# Patient Record
Sex: MALE | Race: BLACK OR AFRICAN AMERICAN | Employment: UNEMPLOYED | URBAN - METROPOLITAN AREA
[De-identification: names, ages, dates, MRNs, and addresses within clinical notes are randomized per-mention and may not be internally consistent; named-entity substitution may affect disease eponyms.]

---

## 2017-10-03 ENCOUNTER — APPOINTMENT (OUTPATIENT)
Dept: GENERAL RADIOLOGY | Facility: CLINIC | Age: 22
End: 2017-10-03
Attending: EMERGENCY MEDICINE

## 2017-10-03 ENCOUNTER — HOSPITAL ENCOUNTER (EMERGENCY)
Facility: CLINIC | Age: 22
Discharge: HOME | End: 2017-10-03
Attending: EMERGENCY MEDICINE | Admitting: EMERGENCY MEDICINE

## 2017-10-03 VITALS
SYSTOLIC BLOOD PRESSURE: 136 MMHG | HEART RATE: 76 BPM | BODY MASS INDEX: 31.65 KG/M2 | DIASTOLIC BLOOD PRESSURE: 93 MMHG | WEIGHT: 190 LBS | RESPIRATION RATE: 16 BRPM | OXYGEN SATURATION: 97 % | HEIGHT: 65 IN | TEMPERATURE: 98.3 F

## 2017-10-03 DIAGNOSIS — R09.A2 GLOBUS SENSATION: ICD-10-CM

## 2017-10-03 PROCEDURE — 70360 X-RAY EXAM OF NECK: CPT

## 2017-10-03 PROCEDURE — 71020 XR CHEST 2 VW: CPT

## 2017-10-03 PROCEDURE — 99284 EMERGENCY DEPT VISIT MOD MDM: CPT | Mod: 25

## 2017-10-03 RX ORDER — LORAZEPAM 1 MG/1
1 TABLET ORAL EVERY 8 HOURS PRN
Qty: 8 TABLET | Refills: 0 | Status: SHIPPED | OUTPATIENT
Start: 2017-10-03 | End: 2018-07-21

## 2017-10-03 ASSESSMENT — ENCOUNTER SYMPTOMS
TROUBLE SWALLOWING: 1
VOICE CHANGE: 0
SORE THROAT: 0

## 2017-10-03 NOTE — ED NOTES
Pt feels as if something is stuck in his throat. Not sure when it started. Pt stated that he took a nyquil gel cap on Saturday and that's the only thing he can think of that could be stuck. Pt has been able to eat and drink. Pain 10/10 in throat.

## 2017-10-03 NOTE — ED AVS SNAPSHOT
[facility]    [address] [place] [address]    Phone:  [phone]    Fax:  [phone]                                       Tariq Rodriguez   MRN: [id number]    Department:  [facility]   Date of Visit:  10/3/2017           Patient Information     Date Of Birth          1995        Your diagnoses for this visit were:     Globus sensation        You were seen by Willie Stein MD.      Follow-up Information     Follow up with [facility].    Why:  for re-evaluation of your symptoms        Discharge Instructions         Conversion Disorder (Conversion Reaction)  You are showing signs of conversion disorder. This happens when stress or conflict triggers physical symptoms. It may in some ways look like a neurological disorder, but is not. Symptoms include:    Not being able to move or feel parts of your body    Not being able to stand or walk normally    Movement of your body that feels out of your control    Loss of normal speech, sight, or hearing    Trouble urinating or swallowing    Tremors (shaking) or non-epileptic seizures (convulsions)  In the ER, you will probably have some tests done to make sure there are not other causes of your symptoms. You may also be given medications to help relax you. The main treatment for conversion disorder is counseling. This can be arranged through your doctor. Our staff may also give you referrals to someone you can talk to.  Home care  If you have been given medicine, take it as you have been told to by the doctor or hospital staff.  Tell each of your healthcare providers about all of the prescription drugs, over-the-counter medicines, vitamins, and supplements you take. Certain supplements interact with medicines and may result in dangerous side effects. Ask your pharmacist when you have questions about drug interactions.  Symptoms of conversion disorder most often improve over a period of  a few weeks without specific medicine. The main treatment for conversion disorder is psychotherapy or counseling. This can include individual and group therapy. Medicine may be used to treat anxiety or depression. Other treatments that might help include physical therapy, hypnosis and relaxation.  Follow-up care  Follow up with your healthcare provider, or as advised.    If X-rays or a CT scan were done, and a radiologist had not seen them while you were there, they will be reviewed, and you will be notified if there is a change in the reading, especially if it affects treatment.  Call 911  Call 911 if any of these occur:    Trouble breathing    Very confused    Very drowsy or trouble awakening    Fainting or loss of consciousness    Rapid heart rate    Seizure  When to seek medical advice  Call your healthcare provider right away if any of the following occur:    Worsening of your symptoms or symptoms not improving over time    New symptoms    Desire to harm yourself or others  Date Last Reviewed: 9/29/2015 2000-2017 [employer]. [address] [place] [address]. All rights reserved. This information is not intended as a substitute for professional medical care. Always follow your healthcare professional's instructions.          24 Hour Appointment Hotline       To make an appointment at any [facility], call [phone] ([phone]). If you don't have a family doctor or clinic, we will help you find one. [facility] are conveniently located to serve the needs of you and your family.             Review of your medicines      START taking        Dose / Directions Last dose taken    LORazepam 1 MG tablet   Commonly known as:  ATIVAN   Dose:  1 mg   Quantity:  8 tablet        Take 1 tablet (1 mg) by mouth every 8 hours as needed for anxiety   Refills:  0        ranitidine 150 MG tablet   Commonly known as:  ZANTAC   Dose:  150 mg   Quantity:  20 tablet        Take 1 tablet  (150 mg) by mouth 2 times daily for 10 days   Refills:  0          Our records show that you are taking the medicines listed below. If these are incorrect, please call your family doctor or clinic.        Dose / Directions Last dose taken    albuterol 108 (90 BASE) MCG/ACT Inhaler   Commonly known as:  PROAIR HFA/PROVENTIL HFA/VENTOLIN HFA   Dose:  2 puff   Quantity:  1 Inhaler        Inhale 2 puffs into the lungs every 6 hours as needed for shortness of breath / dyspnea or wheezing   Refills:  0        SEROQUEL PO   Dose:  250 mg        Take 250 mg by mouth   Refills:  0                Prescriptions were sent or printed at these locations (2 Prescriptions)                   Other Prescriptions                Printed at Department/Unit printer (2 of 2)         ranitidine (ZANTAC) 150 MG tablet               LORazepam (ATIVAN) 1 MG tablet                Procedures and tests performed during your visit     Chest XR,  PA & LAT    Neck soft tissue XR      Orders Needing Specimen Collection     None      Pending Results     Date and Time Order Name Status Description    10/3/2017 1757 Neck soft tissue XR Preliminary             Pending Culture Results     No orders found from 10/1/2017 to 10/4/2017.            Pending Results Instructions     If you had any lab results that were not finalized at the time of your Discharge, you can call the ED Lab Result RN at [phone]. You will be contacted by this team for any positive Lab results or changes in treatment. The nurses are available 7 days a week from 10A to 6:30P.  You can leave a message 24 hours per day and they will return your call.        Test Results From Your Hospital Stay        10/3/2017  6:59 PM      Narrative     NECK SOFT TISSUE ONE VIEW  10/3/2017 6:46 PM     HISTORY: Foreign body sensation.        Impression     IMPRESSION: Unremarkable exam.         10/3/2017  6:49 PM      Narrative     XR CHEST 2 VW 10/3/2017 6:46 PM     HISTORY: f.b. sensation in  lower throat     COMPARISON: 10/12/2011        Impression     IMPRESSION:  No acute pulmonary disease.    FLAQUITO LANCE MD                Clinical Quality Measure: Blood Pressure Screening     Your blood pressure was checked while you were in the emergency department today. The last reading we obtained was  BP: (!) 136/93 . Please read the guidelines below about what these numbers mean and what you should do about them.  If your systolic blood pressure (the top number) is less than 120 and your diastolic blood pressure (the bottom number) is less than 80, then your blood pressure is normal. There is nothing more that you need to do about it.  If your systolic blood pressure (the top number) is 120-139 or your diastolic blood pressure (the bottom number) is 80-89, your blood pressure may be higher than it should be. You should have your blood pressure rechecked within a year by a primary care provider.  If your systolic blood pressure (the top number) is 140 or greater or your diastolic blood pressure (the bottom number) is 90 or greater, you may have high blood pressure. High blood pressure is treatable, but if left untreated over time it can put you at risk for heart attack, stroke, or kidney failure. You should have your blood pressure rechecked by a primary care provider within the next 4 weeks.  If your provider in the emergency department today gave you specific instructions to follow-up with your doctor or provider even sooner than that, you should follow that instruction and not wait for up to 4 weeks for your follow-up visit.        Thank you for choosing [facility]       Thank you for choosing [facility] for your care. Our goal is always to provide you with excellent care. Hearing back from our patients is one way we can continue to improve our services. Please take a few minutes to complete the written survey that you may receive in the mail after you visit with us. Thank you!        MyChart Information      "[employer] lets you send messages to your doctor, view your test results, renew your prescriptions, schedule appointments and more. To sign up, go to [email or web address] . Click on \"Log in\" on the left side of the screen, which will take you to the Welcome page. Then click on \"Sign up Now\" on the right side of the page.     You will be asked to enter the access code listed below, as well as some personal information. Please follow the directions to create your username and password.     Your access code is: [email or web address]  Expires: 2018  7:11 PM     Your access code will  in 90 days. If you need help or a new code, please call your [facility] or [phone].        Care EveryWhere ID     This is your Care EveryWhere ID. This could be used by other organizations to access your [facility] medical records  [id number]        Equal Access to Services     DEMARCO LOCKWOOD AH: Hadii castillo Tamayo, wabeth weber, qated kaalmadano salmon, francesca novak . So [facility] [phone].    ATENCIÓN: Si habla español, tiene a dumont disposición servicios gratuitos de asistencia lingüística. Llame al [phone].    We comply with applicable federal civil rights laws and [place] laws. We do not discriminate on the basis of race, color, national origin, age, disability, sex, sexual orientation, or gender identity.            After Visit Summary       This is your record. Keep this with you and show to your community pharmacist(s) and doctor(s) at your next visit.                  "

## 2017-10-03 NOTE — DISCHARGE INSTRUCTIONS
Conversion Disorder (Conversion Reaction)  You are showing signs of conversion disorder. This happens when stress or conflict triggers physical symptoms. It may in some ways look like a neurological disorder, but is not. Symptoms include:    Not being able to move or feel parts of your body    Not being able to stand or walk normally    Movement of your body that feels out of your control    Loss of normal speech, sight, or hearing    Trouble urinating or swallowing    Tremors (shaking) or non-epileptic seizures (convulsions)  In the ER, you will probably have some tests done to make sure there are not other causes of your symptoms. You may also be given medications to help relax you. The main treatment for conversion disorder is counseling. This can be arranged through your doctor. Our staff may also give you referrals to someone you can talk to.  Home care  If you have been given medicine, take it as you have been told to by the doctor or hospital staff.  Tell each of your healthcare providers about all of the prescription drugs, over-the-counter medicines, vitamins, and supplements you take. Certain supplements interact with medicines and may result in dangerous side effects. Ask your pharmacist when you have questions about drug interactions.  Symptoms of conversion disorder most often improve over a period of a few weeks without specific medicine. The main treatment for conversion disorder is psychotherapy or counseling. This can include individual and group therapy. Medicine may be used to treat anxiety or depression. Other treatments that might help include physical therapy, hypnosis and relaxation.  Follow-up care  Follow up with your healthcare provider, or as advised.    If X-rays or a CT scan were done, and a radiologist had not seen them while you were there, they will be reviewed, and you will be notified if there is a change in the reading, especially if it affects treatment.  Call 911  Call 911 if  any of these occur:    Trouble breathing    Very confused    Very drowsy or trouble awakening    Fainting or loss of consciousness    Rapid heart rate    Seizure  When to seek medical advice  Call your healthcare provider right away if any of the following occur:    Worsening of your symptoms or symptoms not improving over time    New symptoms    Desire to harm yourself or others  Date Last Reviewed: 9/29/2015 2000-2017 [employer]. [address] [place] [address], [place] [id number]. All rights reserved. This information is not intended as a substitute for professional medical care. Always follow your healthcare professional's instructions.

## 2017-10-03 NOTE — ED PROVIDER NOTES
History     Chief Complaint:  Throat Problem       HPI   Tariq Rodriguez is a 22 year old male who presents with sensation of lump in throat since yesterday.  Some discomfort with swallowing, but tolerating po.  No fever, actual choking spell or specific instance of problem swallowing food which could have been caught.  Admits to some associated stress about symptoms.  No prior episode.    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 No pertinent surgical history.     Family History:    family history includes Breast Cancer in his maternal grandmother; Hypertension in his mother.    Social History:   reports that he has been smoking Cigarettes.  He has been smoking about 0.25 packs per day. He has never used smokeless tobacco. He reports that he does not drink alcohol or use illicit drugs.    PCP: No Ref-Primary, Physician     Review of Systems   HENT: Positive for trouble swallowing. Negative for drooling, mouth sores, sore throat and voice change.    All other systems reviewed and are negative.      Physical Exam   Patient Vitals for the past 24 hrs:   BP Temp Temp src Pulse Resp SpO2 Height Weight   10/03/17 1720 (!) 139/92  "98.3  F (36.8  C) Oral 76 16 100 % 1.651 m (5' 5\") 86.2 kg (190 lb)        Physical Exam  General: Patient is alert and cooperative.  Normal voice, handling oral secretions.  HENT:  Normal oropharynx.  No posterior changes, floor of mouth normal.  Moist oral mucosa.  Eyes: EOMI. Normal conjunctiva.  Neck: Normal range of motion. Neck supple. No stridor.    Cardiovascular: Normal rate.   Pulmonary/Chest: Effort normal.   Abdominal: Soft. There is no tenderness.       Musculoskeletal: Normal range of motion. Patient exhibits no edema and no tenderness.   Neurological: Patient  is alert and oriented. Coordination normal, normal CN.  Skin: Skin is warm and dry. No rash noted.   Psychiatric: Patient has a normal mood and affect.    Emergency Department Course       Imaging:  Neck soft tissue x-ray and chest pa/lateral: both normal.    Emergency Department Course:  Past medical records, nursing notes, and vitals reviewed.  I performed an exam of the patient and obtained history, as documented above.  I rechecked the patient. Findings and plan explained to the Patient.    Impression & Plan    Medical Decision Making:  Afebrile healthy young man has presented with globus sensation.  Normal oropharynx, neck, cardiopulmonary and gi examinations.  Testing included soft tissue neck and chest x-ray, in part, to reassure patient.  No indication for flexible naropharyngoscopy, sensation is below vocal cords.  Normal phonation, tolerating po.  No history of consistent GERD, but suggested trial of H2 blocker, prn anxiolytic, and f/u GI for consultation if symptoms persist.   .    Diagnosis:    ICD-10-CM    1. Globus sensation F45.8         Discharge Medications:  New Prescriptions    No medications on file        10/3/2017   Willie Stein MD Isaacson, Brian A, MD  10/04/17 1135    "

## 2017-10-03 NOTE — ED AVS SNAPSHOT
[facility]    [address]    [facility] [address]    Phone:  [phone]    Fax:  [phone]                                       Tariq Rodriguez   MRN: [id number]    Department:  [facility]   Date of Visit:  10/3/2017           After Visit Summary Signature Page     I have received my discharge instructions, and my questions have been answered. I have discussed any challenges I see with this plan with the nurse or doctor.    ..........................................................................................................................................  Patient/Patient Representative Signature      ..........................................................................................................................................  Patient Representative Print Name and Relationship to Patient    ..................................................               ................................................  Date                                            Time    ..........................................................................................................................................  Reviewed by Signature/Title    ...................................................              ..............................................  Date                                                            Time

## 2017-10-04 NOTE — ED NOTES
"Pt standing dressed in doorway stating he needs to catch a bus. Told pt would like d/c set of vitals. Pt states \"I really need to go\". No d/c vitals obtained.  "

## 2017-10-31 ENCOUNTER — HOSPITAL ENCOUNTER (EMERGENCY)
Facility: CLINIC | Age: 22
Discharge: HOME | End: 2017-10-31
Attending: PHYSICIAN ASSISTANT | Admitting: PHYSICIAN ASSISTANT

## 2017-10-31 VITALS
HEART RATE: 78 BPM | DIASTOLIC BLOOD PRESSURE: 77 MMHG | SYSTOLIC BLOOD PRESSURE: 147 MMHG | TEMPERATURE: 98.4 F | RESPIRATION RATE: 20 BRPM | OXYGEN SATURATION: 97 %

## 2017-10-31 DIAGNOSIS — M25.532 PAIN IN BOTH WRISTS: ICD-10-CM

## 2017-10-31 DIAGNOSIS — M25.531 PAIN IN BOTH WRISTS: ICD-10-CM

## 2017-10-31 LAB
ALBUMIN SERPL-MCNC: 3.9 G/DL (ref 3.4–5)
ALP SERPL-CCNC: 73 U/L (ref 40–150)
ALT SERPL W P-5'-P-CCNC: 24 U/L (ref 0–70)
ANION GAP SERPL CALCULATED.3IONS-SCNC: 8 MMOL/L (ref 3–14)
AST SERPL W P-5'-P-CCNC: 22 U/L (ref 0–45)
BILIRUB SERPL-MCNC: 0.4 MG/DL (ref 0.2–1.3)
BUN SERPL-MCNC: 16 MG/DL (ref 7–30)
CALCIUM SERPL-MCNC: 8.5 MG/DL (ref 8.5–10.1)
CHLORIDE SERPL-SCNC: 107 MMOL/L (ref 94–109)
CO2 SERPL-SCNC: 26 MMOL/L (ref 20–32)
CREAT SERPL-MCNC: 1.14 MG/DL (ref 0.66–1.25)
GFR SERPL CREATININE-BSD FRML MDRD: 80 ML/MIN/1.7M2
GLUCOSE SERPL-MCNC: 91 MG/DL (ref 70–99)
NT-PROBNP SERPL-MCNC: <5 PG/ML (ref 0–450)
POTASSIUM SERPL-SCNC: 3.9 MMOL/L (ref 3.4–5.3)
PROT SERPL-MCNC: 7 G/DL (ref 6.8–8.8)
SODIUM SERPL-SCNC: 141 MMOL/L (ref 133–144)

## 2017-10-31 PROCEDURE — 99283 EMERGENCY DEPT VISIT LOW MDM: CPT

## 2017-10-31 PROCEDURE — 36415 COLL VENOUS BLD VENIPUNCTURE: CPT | Performed by: PHYSICIAN ASSISTANT

## 2017-10-31 PROCEDURE — 83880 ASSAY OF NATRIURETIC PEPTIDE: CPT | Performed by: PHYSICIAN ASSISTANT

## 2017-10-31 PROCEDURE — 80053 COMPREHEN METABOLIC PANEL: CPT | Performed by: PHYSICIAN ASSISTANT

## 2017-10-31 RX ORDER — NAPROXEN 500 MG/1
500 TABLET ORAL 2 TIMES DAILY WITH MEALS
Qty: 14 TABLET | Refills: 0 | Status: SHIPPED | OUTPATIENT
Start: 2017-10-31 | End: 2017-11-07

## 2017-10-31 ASSESSMENT — ENCOUNTER SYMPTOMS
ARTHRALGIAS: 1
JOINT SWELLING: 1
NUMBNESS: 0

## 2017-10-31 NOTE — LETTER
[facility] EMERGENCY DEPARTMENT  [address] [place] [address]  Phone: [phone]  Fax: [phone]    October 31, 2017        Tariq Rodriguez  [address] [place] [address]          To whom it may concern:    RE: Tariq Rodriguez    Please excuse Tariq from work tomorrow due to his wrist discomfort. He was advised to rest and ice the areas for the next couple of days.     Please contact me for questions or concerns.      Sincerely,  Carolyn Carbajal PA-C

## 2017-10-31 NOTE — ED AVS SNAPSHOT
[facility] Emergency Department    [address]    [facility] [address]    Phone:  [phone]    Fax:  [phone]                                       Tariq Rodriguez   MRN: [id number]    Department:  [facility] Emergency Department   Date of Visit:  10/31/2017           Patient Information     Date Of Birth          1995        Your diagnoses for this visit were:     Pain in both wrists        You were seen by Carolyn Carbajal PA-C.      Follow-up Information     Follow up with [facility] Emergency Department.    Specialty:  EMERGENCY MEDICINE    Why:  If symptoms worsen    Contact information:    [address] [facility] [address] [phone]        Follow up with [facility] In 5 days.    Why:  if not improving    Contact information:    [address] [facility]  [address]  [facility] [address] [phone]        Discharge Instructions       Rest. Ice, elevate.   Wear splints to help with pain.   Naproxen to help with pain and inflammation. Do not take ibuprofen with this. You can take extra strength Tylenol 1000 mg every 6 hours.   Follow up with primary if not improving in 5 days.         Discharge References/Attachments     TENDONITIS (ENGLISH)      24 Hour Appointment Hotline       To make an appointment at any [facility] clinic, call [phone] ([phone]). If you don't have a family doctor or clinic, we will help you find one. [facility] clinics are conveniently located to serve the needs of you and your family.             Review of your medicines      START taking        Dose / Directions Last dose taken    naproxen 500 MG tablet   Commonly known as:  NAPROSYN   Dose:  500 mg   Quantity:  14 tablet        Take 1 tablet (500 mg) by mouth 2 times daily (with meals) for 7 days   Refills:  0          Our records show that you are taking the medicines listed below. If these are incorrect,  please call your family doctor or clinic.        Dose / Directions Last dose taken    albuterol 108 (90 BASE) MCG/ACT Inhaler   Commonly known as:  PROAIR HFA/PROVENTIL HFA/VENTOLIN HFA   Dose:  2 puff   Quantity:  1 Inhaler        Inhale 2 puffs into the lungs every 6 hours as needed for shortness of breath / dyspnea or wheezing   Refills:  0        LORazepam 1 MG tablet   Commonly known as:  ATIVAN   Dose:  1 mg   Quantity:  8 tablet        Take 1 tablet (1 mg) by mouth every 8 hours as needed for anxiety   Refills:  0        SEROQUEL PO   Dose:  250 mg        Take 250 mg by mouth   Refills:  0                Prescriptions were sent or printed at these locations (1 Prescription)                   Other Prescriptions                Printed at Department/Unit printer (1 of 1)         naproxen (NAPROSYN) 500 MG tablet                Procedures and tests performed during your visit     Comprehensive metabolic panel    Nt probnp inpatient      Orders Needing Specimen Collection     None      Pending Results     No orders found from 10/29/2017 to 11/1/2017.            Pending Culture Results     No orders found from 10/29/2017 to 11/1/2017.            Pending Results Instructions     If you had any lab results that were not finalized at the time of your Discharge, you can call the ED Lab Result RN at [phone]. You will be contacted by this team for any positive Lab results or changes in treatment. The nurses are available 7 days a week from 10A to 6:30P.  You can leave a message 24 hours per day and they will return your call.        Test Results From Your Hospital Stay        10/31/2017  3:33 PM      Component Results     Component Value Ref Range & Units Status    Sodium 141 133 - 144 mmol/L Final    Potassium 3.9 3.4 - 5.3 mmol/L Final    Chloride 107 94 - 109 mmol/L Final    Carbon Dioxide 26 20 - 32 mmol/L Final    Anion Gap 8 3 - 14 mmol/L Final    Glucose 91 70 - 99 mg/dL Final    Urea Nitrogen 16 7 - 30  mg/dL Final    Creatinine 1.14 0.66 - 1.25 mg/dL Final    GFR Estimate 80 >60 mL/min/1.7m2 Final    Non  GFR Calc    GFR Estimate If Black >90 >60 mL/min/1.7m2 Final    African American GFR Calc    Calcium 8.5 8.5 - 10.1 mg/dL Final    Bilirubin Total 0.4 0.2 - 1.3 mg/dL Final    Albumin 3.9 3.4 - 5.0 g/dL Final    Protein Total 7.0 6.8 - 8.8 g/dL Final    Alkaline Phosphatase 73 40 - 150 U/L Final    ALT 24 0 - 70 U/L Final    AST 22 0 - 45 U/L Final         10/31/2017  3:35 PM      Component Results     Component Value Ref Range & Units Status    N-Terminal Pro BNP Inpatient <5 0 - 450 pg/mL Final       Reference range shown and results flagged as abnormal are suggested inpatient   cut points for confirming diagnosis if CHF in an acute setting. Establishing a   baseline value for each individual patient is useful for follow-up. An   inpatient or emergency department NT-proPBNP <300 pg/mL effectively rules out   acute CHF, with 99% negative predictive value.  The outpatient non-acute reference range for ruling out CHF is:   0-125 pg/mL (age 18 to less than 75)   0-450 pg/mL (age 75 yrs and older)                  Clinical Quality Measure: Blood Pressure Screening     Your blood pressure was checked while you were in the emergency department today. The last reading we obtained was  BP: 147/77 . Please read the guidelines below about what these numbers mean and what you should do about them.  If your systolic blood pressure (the top number) is less than 120 and your diastolic blood pressure (the bottom number) is less than 80, then your blood pressure is normal. There is nothing more that you need to do about it.  If your systolic blood pressure (the top number) is 120-139 or your diastolic blood pressure (the bottom number) is 80-89, your blood pressure may be higher than it should be. You should have your blood pressure rechecked within a year by a primary care provider.  If your systolic blood  "pressure (the top number) is 140 or greater or your diastolic blood pressure (the bottom number) is 90 or greater, you may have high blood pressure. High blood pressure is treatable, but if left untreated over time it can put you at risk for heart attack, stroke, or kidney failure. You should have your blood pressure rechecked by a primary care provider within the next 4 weeks.  If your provider in the emergency department today gave you specific instructions to follow-up with your doctor or provider even sooner than that, you should follow that instruction and not wait for up to 4 weeks for your follow-up visit.        Thank you for choosing [facility]       Thank you for choosing [facility] for your care. Our goal is always to provide you with excellent care. Hearing back from our patients is one way we can continue to improve our services. Please take a few minutes to complete the written survey that you may receive in the mail after you visit with us. Thank you!        [employer] Information     [employer] lets you send messages to your doctor, view your test results, renew your prescriptions, schedule appointments and more. To sign up, go to [email or web address] . Click on \"Log in\" on the left side of the screen, which will take you to the Welcome page. Then click on \"Sign up Now\" on the right side of the page.     You will be asked to enter the access code listed below, as well as some personal information. Please follow the directions to create your username and password.     Your access code is: [email or web address]  Expires: 2018  7:11 PM     Your access code will  in 90 days. If you need help or a new code, please call your [facility] clinic or [phone].        Care EveryWhere ID     This is your Care EveryWhere ID. This could be used by other organizations to access your [facility] medical records  [id number]        Equal Access to Services     DEMARCO LOCKWOOD : mai Crespo, " francesca jeter ah. So [facility] [phone].    ATENCIÓN: Si habla jabariañol, tiene a dumont disposición servicios gratuitos de asistencia lingüística. Llame al [phone].    We comply with applicable federal civil rights laws and [place] laws. We do not discriminate on the basis of race, color, national origin, age, disability, sex, sexual orientation, or gender identity.            After Visit Summary       This is your record. Keep this with you and show to your community pharmacist(s) and doctor(s) at your next visit.

## 2017-10-31 NOTE — ED NOTES
Patient arrives complaining of swelling in his bilateral hands, along with some numbness and tingling. Hands do not appear swollen, he is alert and oriented, ABCs intact.

## 2017-10-31 NOTE — ED PROVIDER NOTES
History     Chief Complaint:  Wrist Swelling    HPI   Tariq Rodriguez is a 22 year old male who presents with his family to the ED for evaluation of bilateral wrist swelling. The patient reports his wrist swelling began a week ago. The patient notes he has pain that is an achy sensation. 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 He denies any fevers, overlying redness or warmth as well as any other joint swelling or pain.     Allergies:  Penicillins   Robitussin       Medications:    QUEtiapine Fumarate (SEROQUEL PO)     Past Medical History:    Drug-seeking behavior   Drug idiosyncratic effect  Bilateral presbyopia   Goiter  Pseudofolliculitis barbae  Asthma   ADHD  Bipolar 1 disorder    Past Surgical History:    History reviewed. No pertinent surgical history.    Family History:    HTN    Social History:  Smoking status: Current every day smoker  Alcohol use: No   Presents to ED with family   Marital Status:  Single [1]     Review of Systems   Musculoskeletal: Positive for arthralgias and joint swelling.   Neurological: Negative for numbness.   All other systems reviewed and are negative.    Physical Exam     Patient Vitals for the past 24 hrs:   BP Temp Temp src Pulse Resp SpO2   10/31/17 1359 147/77 98.4  F (36.9  C) Oral 78 20 97 %     Physical Exam  Nursing note and vitals reviewed.     GENERAL: Alert, mild distress   HEENT: Normal conjunctiva. No scleral icterus. MMM.  NECK: Supple.  CARDIAC: Regular rate and rhythm. Intact distal radial pulses 2+. Capillary refill less than 2 seconds.  PULMONARY: Breathing comfortably at rest.    NEURO: Alert and oriented. Non-focal. Sensation intact to light touch distally in bilateral upper extremities.   MUSCULOSKELETAL: Mild tenderness over bilateral wrists, normal ROM without difficulty, no significant overlying edema, erythema or warmth. Normal ROM of all fingers of bilateral hands.   SKIN: Skin is  warm and dry. No rashes. No pallor or jaundice.   PSYCH: Normal affect and mood.      Emergency Department Course     Laboratory:  Nt ProBNP: <5  CMP: o/w WNL (Creatinine 1.14)    Emergency Department Course:  Past medical records, nursing notes, and vitals reviewed.  1424: I performed an exam of the patient and obtained history, as documented above.  Blood drawn. See above lab studies.   Velcro wrist splints provided to patient.     1540: I rechecked the patient. Findings and plan explained to the Patient and family. Patient discharged home with instructions regarding supportive care, medications, and reasons to return. The importance of close follow-up was reviewed.     Impression & Plan      Medical Decision Making:  This is a 22 year old male who presents to the ED for evaluation of bilateral wrist/hand pain and swelling. Here, he is afebrile, hemodynamically stable and non toxic appearing. He is neurovascularly intact in upper extremities. No history of trauma thus doubt acute fracture and do not feel xrays are indicated. Although patient complains of swelling, no significant swelling noted on exam. No clinical evidence of septic joint or overlying cellulitis. Sodium is normal. No other acute electrolyte abnormalities. Liver and kidney function WNL. No signs of fluid overload on exam and BNP WNL making CHF unlikely. No other joint swelling noted on exam. Signs and symptoms most consistent with possible tendonitis or carpal tunnel from overuse. Recommended rest, ice, elevation, naproxen for pain (advised not to take ibuprofen with this) and he was provided velcro wrist splints for comfort. He continuously asked for narcotics for pain. I did not feel opioids were indicated and reviewed this him. He also has drug seeking behavior listed in his chart which concerns me. Advised to see primary care provider if not improving over the next 5 days at which point they can determine if any additional medication is  indicated. [facility] provided as he does not have a primary care provider. Reviewed reasons to return to ED, including worsening symptoms, overlying redness/warmth, high fevers, or any new concerns. The patient was in agreement with plan and discharged in satisfactory condition with all questions answered.      Diagnosis:    ICD-10-CM   1. Pain in both wrists M25.531    M25.532     Disposition: Patient discharged to home with family     Sylwia Hawkins  10/31/2017   [facility] EMERGENCY DEPARTMENT    I, Sylwia Hawkins, am serving as a scribe at 2:24 PM on 10/31/2017 to document services personally performed by Carolyn Carbajal PA-C based on my observations and the provider's statements to me.        Carolyn Carbajal PA-C  10/31/17 1602

## 2017-10-31 NOTE — ED AVS SNAPSHOT
[facility] Emergency Department    [address]    [facility] [address]    Phone:  [phone]    Fax:  [phone]                                       Tariq Rodriguez   MRN: [id number]    Department:  [facility] Emergency Department   Date of Visit:  10/31/2017           After Visit Summary Signature Page     I have received my discharge instructions, and my questions have been answered. I have discussed any challenges I see with this plan with the nurse or doctor.    ..........................................................................................................................................  Patient/Patient Representative Signature      ..........................................................................................................................................  Patient Representative Print Name and Relationship to Patient    ..................................................               ................................................  Date                                            Time    ..........................................................................................................................................  Reviewed by Signature/Title    ...................................................              ..............................................  Date                                                            Time

## 2017-10-31 NOTE — DISCHARGE INSTRUCTIONS
Rest. Ice, elevate.   Wear splints to help with pain.   Naproxen to help with pain and inflammation. Do not take ibuprofen with this. You can take extra strength Tylenol 1000 mg every 6 hours.   Follow up with primary if not improving in 5 days.

## 2018-05-20 ENCOUNTER — HOSPITAL ENCOUNTER (EMERGENCY)
Facility: CLINIC | Age: 23
Discharge: HOME | End: 2018-05-20
Attending: EMERGENCY MEDICINE | Admitting: EMERGENCY MEDICINE

## 2018-05-20 VITALS
DIASTOLIC BLOOD PRESSURE: 67 MMHG | OXYGEN SATURATION: 98 % | SYSTOLIC BLOOD PRESSURE: 102 MMHG | HEART RATE: 90 BPM | TEMPERATURE: 98.3 F | RESPIRATION RATE: 14 BRPM

## 2018-05-20 DIAGNOSIS — Z11.3 SCREENING EXAMINATION FOR VENEREAL DISEASE: ICD-10-CM

## 2018-05-20 PROCEDURE — 99284 EMERGENCY DEPT VISIT MOD MDM: CPT | Mod: 25

## 2018-05-20 PROCEDURE — 25000125 ZZHC RX 250: Performed by: EMERGENCY MEDICINE

## 2018-05-20 PROCEDURE — 25000128 H RX IP 250 OP 636: Performed by: EMERGENCY MEDICINE

## 2018-05-20 PROCEDURE — 87491 CHLMYD TRACH DNA AMP PROBE: CPT | Performed by: EMERGENCY MEDICINE

## 2018-05-20 PROCEDURE — 96372 THER/PROPH/DIAG INJ SC/IM: CPT

## 2018-05-20 PROCEDURE — 25000132 ZZH RX MED GY IP 250 OP 250 PS 637: Performed by: EMERGENCY MEDICINE

## 2018-05-20 PROCEDURE — 87591 N.GONORRHOEAE DNA AMP PROB: CPT | Performed by: EMERGENCY MEDICINE

## 2018-05-20 RX ORDER — PROMETHAZINE HYDROCHLORIDE AND CODEINE PHOSPHATE 6.25; 1 MG/5ML; MG/5ML
5 SYRUP ORAL 4 TIMES DAILY PRN
COMMUNITY
End: 2018-09-22

## 2018-05-20 RX ORDER — AZITHROMYCIN 250 MG/1
1000 TABLET, FILM COATED ORAL ONCE
Status: COMPLETED | OUTPATIENT
Start: 2018-05-20 | End: 2018-05-20

## 2018-05-20 RX ORDER — ALBUTEROL SULFATE 90 UG/1
2 AEROSOL, METERED RESPIRATORY (INHALATION) EVERY 6 HOURS PRN
Qty: 1 INHALER | Refills: 0 | Status: SHIPPED | OUTPATIENT
Start: 2018-05-20 | End: 2018-07-21

## 2018-05-20 RX ADMIN — LIDOCAINE HYDROCHLORIDE 250 MG: 10 INJECTION, SOLUTION EPIDURAL; INFILTRATION; INTRACAUDAL; PERINEURAL at 23:38

## 2018-05-20 RX ADMIN — AZITHROMYCIN 1000 MG: 250 TABLET, FILM COATED ORAL at 23:13

## 2018-05-20 ASSESSMENT — ENCOUNTER SYMPTOMS
DIARRHEA: 1
BLOOD IN STOOL: 0
HEMATURIA: 0
DYSURIA: 0
ABDOMINAL PAIN: 1

## 2018-05-20 NOTE — ED AVS SNAPSHOT
[facility] Emergency Department    [address] [place] [address]    Phone:  [phone]    Fax:  [phone]                                       Tariq Rodriguez   MRN: [id number]    Department:  [facility] Emergency Department   Date of Visit:  5/20/2018           Patient Information     Date Of Birth          1995        Your diagnoses for this visit were:     Screening examination for venereal disease        You were seen by Teodora Araiza MD and Lb Polanco MD.      Follow-up Information     Follow up with Clinic, [facility]. Schedule an appointment as soon as possible for a visit in 3 days.    Contact information:    [address]  [facility] [address] [phone]          Discharge Instructions         You were treated empirically for gonorrhea and chlamydia. Your tests will take 2 days to come back. We will call if either one is positive. Your primary care provider should treat you for your bronchitis and provide any cough syrup/medications.    Gonorrhea or Chlamydia (Adult male)    You have urethritis. This is an inflammation in the urethra. The urethra is the tube between the bladder and the tip of the penis. Urine drains out of the body through the urethra. There are 2 main types of this condition:    Gonococcal urethritis () is an infection caused by gonorrhea.    Non-gonococcal urethritis (REVA) is an infection that is usually caused by chlamydia. Other infections can also be the cause.  Women often have no symptoms. Men are more likely to have symptoms, but may not. Symptoms can start within 1 week after infection, but can take a month or more, if they even occur. Some symptoms are:    Burning or pain when urinating    Irritation in the penis    Pus discharge from the penis    Pain and possible swelling in one or both testicles  Infections in the urethra are usually caused by sexually transmitted diseases, or STDs. The most  common infections are gonorrhea, chlamydia, or both.  Gonorrhea infections  Gonococcal urethritis () is an infection of the urethra. It is caused by gonorrhea. Gonorrhea is a sexually transmitted infection (STI). Gonorrhea can also be in other areas of the body. This can cause:    Rectal pain and discharge    Throat infection    Eye infections (conjunctivitis)  Without treatment, the infection can get worse and spread to other parts of your body. The infection can cause rashes, arthritis, and infections in your joints, heart, and brain.  Non-gonococcal infections, or REVA infections  Non-gonococcal urethritis (REVA) is an infection of the urethra. It is usually caused by chlamydia. Symptoms may clear up in a few weeks or months, even without treatment. However, without treatment, the bacteria that cause REVA can stay in the urethra. This means that even if symptoms clear, you can still have an infection. You can spread it to others if you are not treated.  Urethritis caused by an infection can be cured, but it needs to be treated with antibiotics. If you don't get treated, you can give it to someone else. If you give it to a woman, it can cause a serious pelvic infection and infertility.  It is important to remember that you can have an infection without symptoms. For this reason, your sexual partner(s) needs to be treated, even if he or she has no symptoms. If he or she is not treated, and you continue to have sex, you will be infected again. Your partner(s) should contact his or her own healthcare provider to be examined and treated. An urgent care clinic or the Public Health Department can also do this.  Home care  The following guidelines will help you care for yourself at home:    Take all the antibiotics you were given until they are used up. It is important to finish them, even if you are feeling better. This ensures the infection is completely cleared up.    No sex until both you and your partner(s) have  finished all the antibiotics, and your healthcare provider says you are no longer contagious.    You can take acetaminophen or ibuprofen for pain, unless you were given a different pain medicine. If you have chronic liver or kidney disease or have ever had a stomach ulcer or GI bleeding, or are taking blood thinners, talk with your healthcare provider before using these medicines.    Aspirin should never be used in anyone under 18 years of age who has a fever.    Learn about and use safe sex practices. The safest sex is with a partner who has tested negative and only has sex with you. Condoms can keep some STDs from spreading, including gonorrhea, chlamydia and HIV, but are not a guarantee.  Follow-up care  Follow up with your healthcare provider, or as advised. If a culture test was taken, you may call for the results as directed. Another culture test should be done 4 to 6 weeks after treatment to be sure the infection is gone. Follow up with your healthcare provider or the Public Health Department for a complete STD screening, including HIV testing. For more information about STDs, contact CDC-INFO at 349-269-4951.  When to seek medical advice  Call your healthcare provider right away if any of these occur:    No improvement after 3 days of treatment, although you can have some symptoms longer    Unable to urinate    Rash or joint pain    Painful sores on the penis    Enlarged painful lymph nodes (lumps) in the groin    Testicle pain or swelling of the scrotum  Date Last Reviewed: 10/1/2016    3005-2500 [employer]. [address] [place] [address]. All rights reserved. This information is not intended as a substitute for professional medical care. Always follow your healthcare professional's instructions.          24 Hour Appointment Hotline       To make an appointment at any [facility], call [phone] ([phone]). If you don't have a family doctor or clinic, we will help  you find one. [facility] are conveniently located to serve the needs of you and your family.             Review of your medicines      Our records show that you are taking the medicines listed below. If these are incorrect, please call your family doctor or clinic.        Dose / Directions Last dose taken    albuterol 108 (90 Base) MCG/ACT Inhaler   Commonly known as:  PROAIR HFA/PROVENTIL HFA/VENTOLIN HFA   Dose:  2 puff   Quantity:  1 Inhaler        Inhale 2 puffs into the lungs every 6 hours as needed for shortness of breath / dyspnea or wheezing   Refills:  0        LORazepam 1 MG tablet   Commonly known as:  ATIVAN   Dose:  1 mg   Quantity:  8 tablet        Take 1 tablet (1 mg) by mouth every 8 hours as needed for anxiety   Refills:  0        promethazine-codeine 6.25-10 MG/5ML syrup   Commonly known as:  PHENERGAN with codeine   Dose:  5 mL        Take 5 mLs by mouth 4 times daily as needed for cough   Refills:  0        SEROQUEL PO   Dose:  250 mg        Take 250 mg by mouth   Refills:  0                Prescriptions were sent or printed at these locations (1 Prescription)                   Other Prescriptions                Printed at Department/Unit printer (1 of 1)         albuterol (PROAIR HFA/PROVENTIL HFA/VENTOLIN HFA) 108 (90 Base) MCG/ACT Inhaler                Procedures and tests performed during your visit     Chlamydia trachomatis PCR    Neisseria gonorrhoeae PCR      Orders Needing Specimen Collection     None      Pending Results     Date and Time Order Name Status Description    5/20/2018 2133 Neisseria gonorrhoeae PCR In process     5/20/2018 2133 Chlamydia trachomatis PCR In process             Pending Culture Results     Date and Time Order Name Status Description    5/20/2018 2133 Neisseria gonorrhoeae PCR In process     5/20/2018 2133 Chlamydia trachomatis PCR In process             Pending Results Instructions     If you had any lab results that were not finalized at the time of  your Discharge, you can call the ED Lab Result RN at [phone]. You will be contacted by this team for any positive Lab results or changes in treatment. The nurses are available 7 days a week from 10A to 6:30P.  You can leave a message 24 hours per day and they will return your call.        Test Results From Your Hospital Stay        5/20/2018 10:18 PM         5/20/2018 10:18 PM                Clinical Quality Measure: Blood Pressure Screening     Your blood pressure was checked while you were in the emergency department today. The last reading we obtained was  BP: 102/67 . Please read the guidelines below about what these numbers mean and what you should do about them.  If your systolic blood pressure (the top number) is less than 120 and your diastolic blood pressure (the bottom number) is less than 80, then your blood pressure is normal. There is nothing more that you need to do about it.  If your systolic blood pressure (the top number) is 120-139 or your diastolic blood pressure (the bottom number) is 80-89, your blood pressure may be higher than it should be. You should have your blood pressure rechecked within a year by a primary care provider.  If your systolic blood pressure (the top number) is 140 or greater or your diastolic blood pressure (the bottom number) is 90 or greater, you may have high blood pressure. High blood pressure is treatable, but if left untreated over time it can put you at risk for heart attack, stroke, or kidney failure. You should have your blood pressure rechecked by a primary care provider within the next 4 weeks.  If your provider in the emergency department today gave you specific instructions to follow-up with your doctor or provider even sooner than that, you should follow that instruction and not wait for up to 4 weeks for your follow-up visit.        Thank you for choosing [facility]       Thank you for choosing [facility] for your care. Our goal is always to provide you with  "excellent care. Hearing back from our patients is one way we can continue to improve our services. Please take a few minutes to complete the written survey that you may receive in the mail after you visit with us. Thank you!        [employer] Information     [employer] lets you send messages to your doctor, view your test results, renew your prescriptions, schedule appointments and more. To sign up, go to [email or web address] . Click on \"Log in\" on the left side of the screen, which will take you to the Welcome page. Then click on \"Sign up Now\" on the right side of the page.     You will be asked to enter the access code listed below, as well as some personal information. Please follow the directions to create your username and password.     Your access code is: [email or web address]  Expires: 2018 11:03 PM     Your access code will  in 90 days. If you need help or a new code, please call your [facility] clinic or [phone].        Care EveryWhere ID     This is your Care EveryWhere ID. This could be used by other organizations to access your [facility] medical records  [id number]        Equal Access to Services     [facility] Center[facility] : Hadii castillo Tamayo, mai weber, hue salmon, francesca novak . So [facility] [phone].    ATENCIÓN: Si habla español, tiene a dumont disposición servicios gratuitos de asistencia lingüística. Kavin al [phone].    We comply with applicable federal civil rights laws and [place] laws. We do not discriminate on the basis of race, color, national origin, age, disability, sex, sexual orientation, or gender identity.            After Visit Summary       This is your record. Keep this with you and show to your community pharmacist(s) and doctor(s) at your next visit.                  "

## 2018-05-20 NOTE — ED AVS SNAPSHOT
[facility] Emergency Department    [address]    [facility] [address]    Phone:  [phone]    Fax:  [phone]                                       Tariq Rodriguez   MRN: [id number]    Department:  [facility] Emergency Department   Date of Visit:  5/20/2018           After Visit Summary Signature Page     I have received my discharge instructions, and my questions have been answered. I have discussed any challenges I see with this plan with the nurse or doctor.    ..........................................................................................................................................  Patient/Patient Representative Signature      ..........................................................................................................................................  Patient Representative Print Name and Relationship to Patient    ..................................................               ................................................  Date                                            Time    ..........................................................................................................................................  Reviewed by Signature/Title    ...................................................              ..............................................  Date                                                            Time

## 2018-05-21 LAB
C TRACH DNA SPEC QL NAA+PROBE: NEGATIVE
N GONORRHOEA DNA SPEC QL NAA+PROBE: NEGATIVE
SPECIMEN SOURCE: NORMAL
SPECIMEN SOURCE: NORMAL

## 2018-05-21 NOTE — ED PROVIDER NOTES
"  History     Chief Complaint:  here for std testing    HPI   Tariq Rodriguez is a 23 year old male who presents to the emergency department today for STD testing. The patient reportedly cheated on his girlfriend and was exposed to STD. He is requesting to be tested and treated. He denies symptoms. He has not had any discharge from the penis. But he says he feels like \"something is off.\" He cannot elaborate on this further. He denies abd pain, fever, dysuria, frequency.  He otherwise feels well.     Allergies:  Penicillins  Robitussin Pediatric     Medications:    Proair  Phenergan with codeine   Seroquel  Ativan    Past Medical History:    ADHD  Asthma  Drug seeking behavior  Bilateral presbyopia  Bipolar  Goiter  Bronchitis  Hypothyroid  Pseudofolliculitis barbae     Past Surgical History:    History reviewed. No pertinent past surgical history.    Family History:    Hypertension  Breast cancer    Social History:  The patient was accompanied to the ED by girlfriend.  Smoking Status: Current every day  Smokeless Tobacco: Never  Alcohol Use: No   Marital Status:  Single     Review of Systems   Gastrointestinal: Positive for abdominal pain and diarrhea. Negative for blood in stool.   Genitourinary: Negative for discharge, dysuria and hematuria.   All other systems reviewed and are negative.    Physical Exam     Patient Vitals for the past 24 hrs:   BP Temp Temp src Pulse Heart Rate Resp SpO2   05/20/18 2252 102/67 - - - 67 14 98 %   05/20/18 2054 128/72 98.3  F (36.8  C) Temporal 90 - 18 98 %      Physical Exam  General: Well appearing, nontoxic. Resting comfortably  Head:  Scalp, face, and head appear normal  Eyes:  Pupils equal, round    Conjunctivae noninjected and sclera white  ENT:    The nose is normal    Ears/pinnae are normal  Neck:  Normal range of motion  CV:  RRR, no M/R/G  Resp:  CTAB, no increased WORK OF BREATHING  Abd:  Soft non tender to palpation, no guarding or rebound.  :  Normal circumcised male " genitalia. Urethral meatus normal    without bleeding or discharge. No lesions or rash.   MSK:  Normal tone.  Skin:  No rash or lesions noted.  Neuro:  Speech is normal and fluent    Moves all extremities spontaneously  Psych: Awake, Alert. Normal affect      Appropriate interactions         Emergency Department Course   Laboratory:  Laboratory findings were communicated with the patient who voiced understanding of the findings.  Chlamydia trachomatis PCR: Pending   Neisseria gonorrhea PCR: Pending     Emergency Department Course:  Nursing notes and vitals reviewed.  2138: I performed an exam of the patient as documented above.   2303: Findings and plan explained to the Patient. Patient discharged home with instructions regarding supportive care, medications, and reasons to return. The importance of close follow-up was reviewed.   I personally reviewed the laboratory results with the Patient and answered all related questions prior to discharge.   Impression & Plan    Medical Decision Making:  This patient presented to the Emergency Department with recent STD exposure.  The clinical exam today is non-specific and non-focal and non-surgical. Urine sent for gonorrhea/chlamydia testing.  Imaging is not indicated.  He has no abdominal pain and no other concerning symptoms.  No obvious lesions noted on exam, no discharge.  Treated here as above, instructed to refrain from sex x 10 days and not to have sex with any partners until they have also been treated and to make sure all partners within the last 2 months be contacted and instructed to see an MD. Return precautions were discussed with patient. The patient's questions were answered and the patient was agreeable with discharge.       Diagnosis:    ICD-10-CM    1. Screening examination for venereal disease Z11.3        Disposition:  discharged to home    Scribe Disclosure:  Caroline SANDERS, am serving as a scribe at 9:48 PM on 5/20/2018 to document services  personally performed by Lb Polanco MD based on my observations and the provider's statements to me.    5/20/2018   [facility] EMERGENCY DEPARTMENT       Lb Polanco MD  05/21/18 9682

## 2018-05-21 NOTE — DISCHARGE INSTRUCTIONS
You were treated empirically for gonorrhea and chlamydia. Your tests will take 2 days to come back. We will call if either one is positive. Your primary care provider should treat you for your bronchitis and provide any cough syrup/medications.    Gonorrhea or Chlamydia (Adult male)    You have urethritis. This is an inflammation in the urethra. The urethra is the tube between the bladder and the tip of the penis. Urine drains out of the body through the urethra. There are 2 main types of this condition:    Gonococcal urethritis () is an infection caused by gonorrhea.    Non-gonococcal urethritis (REVA) is an infection that is usually caused by chlamydia. Other infections can also be the cause.  Women often have no symptoms. Men are more likely to have symptoms, but may not. Symptoms can start within 1 week after infection, but can take a month or more, if they even occur. Some symptoms are:    Burning or pain when urinating    Irritation in the penis    Pus discharge from the penis    Pain and possible swelling in one or both testicles  Infections in the urethra are usually caused by sexually transmitted diseases, or STDs. The most common infections are gonorrhea, chlamydia, or both.  Gonorrhea infections  Gonococcal urethritis () is an infection of the urethra. It is caused by gonorrhea. Gonorrhea is a sexually transmitted infection (STI). Gonorrhea can also be in other areas of the body. This can cause:    Rectal pain and discharge    Throat infection    Eye infections (conjunctivitis)  Without treatment, the infection can get worse and spread to other parts of your body. The infection can cause rashes, arthritis, and infections in your joints, heart, and brain.  Non-gonococcal infections, or REVA infections  Non-gonococcal urethritis (REVA) is an infection of the urethra. It is usually caused by chlamydia. Symptoms may clear up in a few weeks or months, even without treatment. However, without treatment, the  bacteria that cause REVA can stay in the urethra. This means that even if symptoms clear, you can still have an infection. You can spread it to others if you are not treated.  Urethritis caused by an infection can be cured, but it needs to be treated with antibiotics. If you don't get treated, you can give it to someone else. If you give it to a woman, it can cause a serious pelvic infection and infertility.  It is important to remember that you can have an infection without symptoms. For this reason, your sexual partner(s) needs to be treated, even if he or she has no symptoms. If he or she is not treated, and you continue to have sex, you will be infected again. Your partner(s) should contact his or her own healthcare provider to be examined and treated. An urgent care clinic or the Public Health Department can also do this.  Home care  The following guidelines will help you care for yourself at home:    Take all the antibiotics you were given until they are used up. It is important to finish them, even if you are feeling better. This ensures the infection is completely cleared up.    No sex until both you and your partner(s) have finished all the antibiotics, and your healthcare provider says you are no longer contagious.    You can take acetaminophen or ibuprofen for pain, unless you were given a different pain medicine. If you have chronic liver or kidney disease or have ever had a stomach ulcer or GI bleeding, or are taking blood thinners, talk with your healthcare provider before using these medicines.    Aspirin should never be used in anyone under 18 years of age who has a fever.    Learn about and use safe sex practices. The safest sex is with a partner who has tested negative and only has sex with you. Condoms can keep some STDs from spreading, including gonorrhea, chlamydia and HIV, but are not a guarantee.  Follow-up care  Follow up with your healthcare provider, or as advised. If a culture test was  taken, you may call for the results as directed. Another culture test should be done 4 to 6 weeks after treatment to be sure the infection is gone. Follow up with your healthcare provider or the Public Health Department for a complete STD screening, including HIV testing. For more information about STDs, contact CDC-INFO at 010-492-8483.  When to seek medical advice  Call your healthcare provider right away if any of these occur:    No improvement after 3 days of treatment, although you can have some symptoms longer    Unable to urinate    Rash or joint pain    Painful sores on the penis    Enlarged painful lymph nodes (lumps) in the groin    Testicle pain or swelling of the scrotum  Date Last Reviewed: 10/1/2016    5620-7004 [employer]. [address] [place] [address], [place] [address]. All rights reserved. This information is not intended as a substitute for professional medical care. Always follow your healthcare professional's instructions.

## 2018-06-19 ENCOUNTER — HOSPITAL ENCOUNTER (EMERGENCY)
Facility: CLINIC | Age: 23
Discharge: HOME | End: 2018-06-19
Attending: PHYSICIAN ASSISTANT | Admitting: PHYSICIAN ASSISTANT

## 2018-06-19 VITALS
WEIGHT: 150 LBS | OXYGEN SATURATION: 97 % | DIASTOLIC BLOOD PRESSURE: 95 MMHG | SYSTOLIC BLOOD PRESSURE: 133 MMHG | BODY MASS INDEX: 24.11 KG/M2 | TEMPERATURE: 97.2 F | RESPIRATION RATE: 16 BRPM | HEIGHT: 66 IN

## 2018-06-19 DIAGNOSIS — F43.0 ACUTE REACTION TO STRESS: ICD-10-CM

## 2018-06-19 LAB
ALBUMIN SERPL-MCNC: 5.1 G/DL (ref 3.4–5)
ALP SERPL-CCNC: 76 U/L (ref 40–150)
ALT SERPL W P-5'-P-CCNC: 34 U/L (ref 0–70)
ANION GAP SERPL CALCULATED.3IONS-SCNC: 7 MMOL/L (ref 3–14)
AST SERPL W P-5'-P-CCNC: 26 U/L (ref 0–45)
BASOPHILS # BLD AUTO: 0 10E9/L (ref 0–0.2)
BASOPHILS NFR BLD AUTO: 0.3 %
BILIRUB SERPL-MCNC: 1.8 MG/DL (ref 0.2–1.3)
BUN SERPL-MCNC: 10 MG/DL (ref 7–30)
CALCIUM SERPL-MCNC: 9.3 MG/DL (ref 8.5–10.1)
CHLORIDE SERPL-SCNC: 104 MMOL/L (ref 94–109)
CO2 SERPL-SCNC: 27 MMOL/L (ref 20–32)
CREAT SERPL-MCNC: 1.15 MG/DL (ref 0.66–1.25)
DIFFERENTIAL METHOD BLD: ABNORMAL
EOSINOPHIL # BLD AUTO: 0 10E9/L (ref 0–0.7)
EOSINOPHIL NFR BLD AUTO: 0 %
ERYTHROCYTE [DISTWIDTH] IN BLOOD BY AUTOMATED COUNT: 11.7 % (ref 10–15)
GFR SERPL CREATININE-BSD FRML MDRD: 79 ML/MIN/1.7M2
GLUCOSE SERPL-MCNC: 115 MG/DL (ref 70–99)
HCT VFR BLD AUTO: 50.2 % (ref 40–53)
HGB BLD-MCNC: 18 G/DL (ref 13.3–17.7)
IMM GRANULOCYTES # BLD: 0 10E9/L (ref 0–0.4)
IMM GRANULOCYTES NFR BLD: 0.1 %
LYMPHOCYTES # BLD AUTO: 1.2 10E9/L (ref 0.8–5.3)
LYMPHOCYTES NFR BLD AUTO: 16.4 %
MCH RBC QN AUTO: 32.8 PG (ref 26.5–33)
MCHC RBC AUTO-ENTMCNC: 35.9 G/DL (ref 31.5–36.5)
MCV RBC AUTO: 92 FL (ref 78–100)
MONOCYTES # BLD AUTO: 0.6 10E9/L (ref 0–1.3)
MONOCYTES NFR BLD AUTO: 8.3 %
NEUTROPHILS # BLD AUTO: 5.4 10E9/L (ref 1.6–8.3)
NEUTROPHILS NFR BLD AUTO: 74.9 %
NRBC # BLD AUTO: 0 10*3/UL
NRBC BLD AUTO-RTO: 0 /100
PLATELET # BLD AUTO: 258 10E9/L (ref 150–450)
POTASSIUM SERPL-SCNC: 3.3 MMOL/L (ref 3.4–5.3)
PROT SERPL-MCNC: 8.6 G/DL (ref 6.8–8.8)
RBC # BLD AUTO: 5.48 10E12/L (ref 4.4–5.9)
SODIUM SERPL-SCNC: 138 MMOL/L (ref 133–144)
TSH SERPL DL<=0.005 MIU/L-ACNC: 0.46 MU/L (ref 0.4–4)
WBC # BLD AUTO: 7.2 10E9/L (ref 4–11)

## 2018-06-19 PROCEDURE — 84443 ASSAY THYROID STIM HORMONE: CPT | Performed by: PHYSICIAN ASSISTANT

## 2018-06-19 PROCEDURE — 80053 COMPREHEN METABOLIC PANEL: CPT | Performed by: PHYSICIAN ASSISTANT

## 2018-06-19 PROCEDURE — 99283 EMERGENCY DEPT VISIT LOW MDM: CPT

## 2018-06-19 PROCEDURE — 99285 EMERGENCY DEPT VISIT HI MDM: CPT | Mod: 25

## 2018-06-19 PROCEDURE — 90791 PSYCH DIAGNOSTIC EVALUATION: CPT

## 2018-06-19 PROCEDURE — 25000125 ZZHC RX 250: Performed by: PHYSICIAN ASSISTANT

## 2018-06-19 PROCEDURE — 85025 COMPLETE CBC W/AUTO DIFF WBC: CPT | Performed by: PHYSICIAN ASSISTANT

## 2018-06-19 PROCEDURE — 36415 COLL VENOUS BLD VENIPUNCTURE: CPT | Performed by: PHYSICIAN ASSISTANT

## 2018-06-19 RX ORDER — ONDANSETRON 4 MG/1
4 TABLET, ORALLY DISINTEGRATING ORAL ONCE
Status: COMPLETED | OUTPATIENT
Start: 2018-06-19 | End: 2018-06-19

## 2018-06-19 RX ADMIN — ONDANSETRON 4 MG: 4 TABLET, ORALLY DISINTEGRATING ORAL at 10:47

## 2018-06-19 ASSESSMENT — ENCOUNTER SYMPTOMS
WEAKNESS: 1
SLEEP DISTURBANCE: 1
NERVOUS/ANXIOUS: 1
LIGHT-HEADEDNESS: 1
APPETITE CHANGE: 1
SHORTNESS OF BREATH: 1
NAUSEA: 1
HEADACHES: 1
DIZZINESS: 1

## 2018-06-19 NOTE — ED PROVIDER NOTES
History     Chief Complaint:  Nausea; Headache; Dizziness; Shortness of Breath; and Insomnia      HPI   Tariq Rodriguez is a 23 year old male with a history of asthma who presents to the emergency department for evaluation of nausea, headache, dizziness, shortness of breath, and insomnia. The patient reports symptoms for the past four or five days related to relationship problems with his significant other of 7 years. He cheated on her recently, and then she cheated on him, and he feels that she handles this the wrong way. He has never experienced similar stress or anxiety in the past. He reports not sleeping or eating in the past three days, along with shortness of breath, nausea, dizziness, lightheadedness, generalized weakness, and nausea. He says that he feels his asthma is exacerbated recently, but does not have an inhaler to improve this. He reports not eating because he feel like he will vomit it up or gag. He reports sleeping about 6 or 7 hours total in the past three days. He has been smoking marijuana to take his mind off things. The patient denies any thoughts of hurting himself or others.     Allergies:  Penicillins  Robitussin Pediatric [Dextromethorphan]     Medications:    Albuterol inhaler  Ativan  Phenergan w codeine  Seroquel     Past Medical History:    ADHD (attention deficit hyperactivity disorder)   Asthma   Bilateral presbyopia   Bipolar 1 disorder   Goiter   Hypertension   Pseudofolliculitis barbae     Past Surgical History:    History reviewed. No pertinent surgical history.    Family History:    Hypertension  Mother  Breast Cancer  Maternal Grandmother    Social History:  Smoking Status: Current Every Day Smoker  Smokeless Tobacco: Never Used  Alcohol Use: No  Marital Status: Single (Has a long time significant other which he sometimes refers to as wife.)    Review of Systems   Constitutional: Positive for appetite change (anorexia).   Respiratory: Positive for shortness of breath.   "  Gastrointestinal: Positive for nausea.   Neurological: Positive for dizziness, weakness (generalized), light-headedness and headaches.   Psychiatric/Behavioral: Positive for sleep disturbance. Negative for self-injury and suicidal ideas. The patient is nervous/anxious.    All other systems reviewed and are negative.    Physical Exam     Patient Vitals for the past 24 hrs:   BP Temp Temp src Heart Rate Resp SpO2 Height Weight   06/19/18 1022 (!) 133/95 97.2  F (36.2  C) Temporal 71 16 97 % 1.676 m (5' 6\") 68 kg (150 lb)     Physical Exam  General: Alert, interactive. GCS 15  Head:  Scalp is atraumatic.  Eyes:  EOM intact. The pupils are equal, round, and reactive to light. No scleral icterus.  ENT:                                      Ears:  The external ears are normal.  Nose:  The external nose is normal.  Throat:  The oropharynx is normal. Mucus membranes are moist.                 Neck:  Normal range of motion. There is no rigidity.   CV:  Regular rate and rhythm. No murmur. 2+ radial pulses  Resp:  Breath sounds are clear bilaterally. Non-labored, no retractions or accessory muscle use. No wheezing.   GI:  Abdomen is soft, no distension, no tenderness.   MS:  Normal range of motion.   Skin:  Warm and dry.   Neuro:  Strength and sensation grossly intact.   Psych:  Awake. Alert.  Appropriate interactions.     Emergency Department Course     Laboratory:  Laboratory findings were communicated with the patient who voiced understanding of the findings.    CBC: WBC 7.2, HGB 18.0 (H),   CMP: Potassium 3.3 (L), Glucose 115 (H), Bilirubin Total 1.8 (H), Albumin 5.1 (H) o/w WNL (Creatinine 1.15)  TSH with free T4 reflex: 0.46    Interventions:  1047 Zofran 4 mg PO     Emergency Department Course:     Nursing notes and vitals reviewed.     I performed an exam of the patient as documented above.     Blood was drawn for laboratory testing, results above.     1132 I discussed the patient's case with [facility] . They " will evaluate the patient.    1208 [facility] called to inform me that they cannot schedule a mental health appointment because the patient does not have insurance.    1230 The patient has left the department without any further discussion, evaluation, or treatment.     Impression & Plan      Medical Decision Making:  Tariq Rodriguez is a 23 year old male who presents to the emergency department today with multiple complaints including nausea, shortness of breath, and insomnia.  Patient reports he feels symptoms are related to a stress reaction due to current relationship problem.  Vitals normal on arrival.  Patient was a history of asthma, no wheezing auscultated on lung exam and no hypoxia or respiratory distress. Given the patient's nausea and multiple complaints basic labs were completed including CBC and CMP, along with TSH which were overall unremarkable. Mild hypokalemia of 3.3. I went into the patient's room to discuss his results and nursing noted he was in the waiting room charging his iPhone.  I asked the [facility]UC to call the lobby and tell the patient come to the emergency department so I could discuss his results and they reported there is no one with that name in the lobby.  It appears patient left the department without receiving his results.  The patient declined homicidal and suicidal ideation.  I contacted [facility] to come see the patient and provided resources, though with no insurance they were limited options so they recommended printing out mental health resources for patients' without insurance. I was prepared to give this to the patient, though again the patient left prior to me being able to do this. This mental health resource document along with the patient's discharge instructions were printed out and I believe the nurse called the patient and told him they would be in the lobby if he wanted to stop by and pick them up.  The patient was in no acute distress and I doubt an emergent problem that needed to be  addressed today.     Diagnosis:    ICD-10-CM    1. Acute reaction to stress F43.0      Disposition:   Patient eloped.      Scribe Disclosure:  I, Erika Navarrete, am serving as a scribe at 10:35 AM on 6/19/2018 to document services personally performed by Molly Alvarez PA-C, based on my observations and the provider's statements to me.    [facility] EMERGENCY DEPARTMENT       Molly Alvarez PA-C  06/19/18 1907

## 2018-06-19 NOTE — ED AVS SNAPSHOT
[facility] Emergency Department    [address]    [facility] [address]    Phone:  [phone]    Fax:  [phone]                                       Tariq Rodriguez   MRN: [id number]    Department:  [facility] Emergency Department   Date of Visit:  6/19/2018           After Visit Summary Signature Page     I have received my discharge instructions, and my questions have been answered. I have discussed any challenges I see with this plan with the nurse or doctor.    ..........................................................................................................................................  Patient/Patient Representative Signature      ..........................................................................................................................................  Patient Representative Print Name and Relationship to Patient    ..................................................               ................................................  Date                                            Time    ..........................................................................................................................................  Reviewed by Signature/Title    ...................................................              ..............................................  Date                                                            Time

## 2018-06-19 NOTE — ED AVS SNAPSHOT
[facility] Emergency Department    [address]    [facility] [address]    Phone:  [phone]    Fax:  [phone]                                       Tariq Rodriguez   MRN: [id number]    Department:  [facility] Emergency Department   Date of Visit:  6/19/2018           Patient Information     Date Of Birth          1995        Your diagnoses for this visit were:     Acute reaction to stress        You were seen by Molly Alvarez PA-C.      Follow-up Information     Follow up with Clinic, [facility] In 2 days.    Contact information:    [address]  [facility] [address]  [phone]          Follow up with [facility] Emergency Department.    Specialty:  EMERGENCY MEDICINE    Why:  As needed, If symptoms worsen    Contact information:    [address]  [facility] [address]  [phone]        Discharge Instructions       *Follow up with resources provided       Discharge Instructions  Mental Health Concerns    You were seen today for mental health concerns, such as depression, anxiety, or suicidal thinking. Your provider feels that you do not require hospitalization at this time. However, your symptoms may become worse, and you may need to return to the Emergency Department. Most treatments of depression and suicidal thoughts are a process rather than a single intervention.  Medications and counseling can take several weeks or more to help.    Generally, every Emergency Department visit should have a follow-up clinic visit with either a primary or a specialty clinic/provider. Please follow-up as instructed by your emergency provider today.    By accepting these discharge instructions:    You promise to not harm yourself or others.    You agree that if you feel you are becoming unable to keep that promise, you will do something to help yourself before you do anything to harm yourself or others.     You agree to keep any  safety plan arranged on your visit here today.    You agree to take any medication prescribed or recommended by your provider.    If you are getting worse, you can contact a friend or a family member, contact your counselor or family provider, contact a crisis line, or other options discussed with the provider or therapist today.    At any time, you can call 911 and return to the Emergency Department for more help.    You understand that follow-up is essential to your treatment, and you will make and keep appointments recommended on your visit today.    How to improve your mental health and prevent suicide:    Involve others by letting family, friends, counselors know.  Do not isolate yourself.    Avoid alcohol or drugs. Remove weapons, poisons from your home.    Try to stick to routines for eating, sleeping and getting regular exercise.      Try to get into sunlight. Bright natural light not only treats seasonal affective disorder but also depression.    Increase safe activities that you enjoy.    If you feel worse, contact [phone] ([phone]), or call 911, or your primary provider/counselor for additional assistance.    If you were given a prescription for medicine here today, be sure to read all of the information (including the package insert) that comes with your prescription.  This will include important information about the medicine, its side effects, and any warnings that you need to know about.  The pharmacist who fills the prescription can provide more information and answer questions you may have about the medicine.  If you have questions or concerns that the pharmacist cannot address, please call or return to the Emergency Department.   Remember that you can always come back to the Emergency Department if you are not able to see your regular provider in the amount of time listed above, if you get any new symptoms, or if there is anything that worries you.      24 Hour Appointment Hotline        To make an appointment at any [facility], call [phone] ([phone]). If you don't have a family doctor or clinic, we will help you find one. [facility] are conveniently located to serve the needs of you and your family.             Review of your medicines      Our records show that you are taking the medicines listed below. If these are incorrect, please call your family doctor or clinic.        Dose / Directions Last dose taken    albuterol 108 (90 Base) MCG/ACT Inhaler   Commonly known as:  PROAIR HFA/PROVENTIL HFA/VENTOLIN HFA   Dose:  2 puff   Quantity:  1 Inhaler        Inhale 2 puffs into the lungs every 6 hours as needed for shortness of breath / dyspnea or wheezing   Refills:  0        LORazepam 1 MG tablet   Commonly known as:  ATIVAN   Dose:  1 mg   Quantity:  8 tablet        Take 1 tablet (1 mg) by mouth every 8 hours as needed for anxiety   Refills:  0        promethazine-codeine 6.25-10 MG/5ML syrup   Commonly known as:  PHENERGAN with codeine   Dose:  5 mL        Take 5 mLs by mouth 4 times daily as needed for cough   Refills:  0        SEROQUEL PO   Dose:  250 mg        Take 250 mg by mouth   Refills:  0                Procedures and tests performed during your visit     CBC + differential    Comprehensive metabolic panel    TSH with free T4 reflex      Orders Needing Specimen Collection     None      Pending Results     No orders found from 6/17/2018 to 6/20/2018.            Pending Culture Results     No orders found from 6/17/2018 to 6/20/2018.            Pending Results Instructions     If you had any lab results that were not finalized at the time of your Discharge, you can call the ED Lab Result RN at [phone]. You will be contacted by this team for any positive Lab results or changes in treatment. The nurses are available 7 days a week from 10A to 6:30P.  You can leave a message 24 hours per day and they will return your call.        Test Results From Your Hospital  Stay        6/19/2018 11:23 AM      Component Results     Component Value Ref Range & Units Status    WBC 7.2 4.0 - 11.0 10e9/L Final    RBC Count 5.48 4.4 - 5.9 10e12/L Final    Hemoglobin 18.0 (H) 13.3 - 17.7 g/dL Final    Hematocrit 50.2 40.0 - 53.0 % Final    MCV 92 78 - 100 fl Final    MCH 32.8 26.5 - 33.0 pg Final    MCHC 35.9 31.5 - 36.5 g/dL Final    RDW 11.7 10.0 - 15.0 % Final    Platelet Count 258 150 - 450 10e9/L Final    Diff Method Automated Method  Final    % Neutrophils 74.9 % Final    % Lymphocytes 16.4 % Final    % Monocytes 8.3 % Final    % Eosinophils 0.0 % Final    % Basophils 0.3 % Final    % Immature Granulocytes 0.1 % Final    Nucleated RBCs 0 0 /100 Final    Absolute Neutrophil 5.4 1.6 - 8.3 10e9/L Final    Absolute Lymphocytes 1.2 0.8 - 5.3 10e9/L Final    Absolute Monocytes 0.6 0.0 - 1.3 10e9/L Final    Absolute Eosinophils 0.0 0.0 - 0.7 10e9/L Final    Absolute Basophils 0.0 0.0 - 0.2 10e9/L Final    Abs Immature Granulocytes 0.0 0 - 0.4 10e9/L Final    Absolute Nucleated RBC 0.0  Final         6/19/2018 11:43 AM      Component Results     Component Value Ref Range & Units Status    Sodium 138 133 - 144 mmol/L Final    Potassium 3.3 (L) 3.4 - 5.3 mmol/L Final    Chloride 104 94 - 109 mmol/L Final    Carbon Dioxide 27 20 - 32 mmol/L Final    Anion Gap 7 3 - 14 mmol/L Final    Glucose 115 (H) 70 - 99 mg/dL Final    Urea Nitrogen 10 7 - 30 mg/dL Final    Creatinine 1.15 0.66 - 1.25 mg/dL Final    GFR Estimate 79 >60 mL/min/1.7m2 Final    Non  GFR Calc    GFR Estimate If Black >90 >60 mL/min/1.7m2 Final    African American GFR Calc    Calcium 9.3 8.5 - 10.1 mg/dL Final    Bilirubin Total 1.8 (H) 0.2 - 1.3 mg/dL Final    Albumin 5.1 (H) 3.4 - 5.0 g/dL Final    Protein Total 8.6 6.8 - 8.8 g/dL Final    Alkaline Phosphatase 76 40 - 150 U/L Final    ALT 34 0 - 70 U/L Final    AST 26 0 - 45 U/L Final         6/19/2018 11:48 AM      Component Results     Component Value Ref Range &  Units Status    TSH 0.46 0.40 - 4.00 mU/L Final                Clinical Quality Measure: Blood Pressure Screening     Your blood pressure was checked while you were in the emergency department today. The last reading we obtained was  BP: (!) 133/95 . Please read the guidelines below about what these numbers mean and what you should do about them.  If your systolic blood pressure (the top number) is less than 120 and your diastolic blood pressure (the bottom number) is less than 80, then your blood pressure is normal. There is nothing more that you need to do about it.  If your systolic blood pressure (the top number) is 120-139 or your diastolic blood pressure (the bottom number) is 80-89, your blood pressure may be higher than it should be. You should have your blood pressure rechecked within a year by a primary care provider.  If your systolic blood pressure (the top number) is 140 or greater or your diastolic blood pressure (the bottom number) is 90 or greater, you may have high blood pressure. High blood pressure is treatable, but if left untreated over time it can put you at risk for heart attack, stroke, or kidney failure. You should have your blood pressure rechecked by a primary care provider within the next 4 weeks.  If your provider in the emergency department today gave you specific instructions to follow-up with your doctor or provider even sooner than that, you should follow that instruction and not wait for up to 4 weeks for your follow-up visit.        Thank you for choosing [facility]       Thank you for choosing [facility] for your care. Our goal is always to provide you with excellent care. Hearing back from our patients is one way we can continue to improve our services. Please take a few minutes to complete the written survey that you may receive in the mail after you visit with us. Thank you!        [facility] In[facility] Information     [facility] lets you send messages to your doctor, view your test results, renew  "your prescriptions, schedule appointments and more. To sign up, go to [email or web address] . Click on \"Log in\" on the left side of the screen, which will take you to the Welcome page. Then click on \"Sign up Now\" on the right side of the page.     You will be asked to enter the access code listed below, as well as some personal information. Please follow the directions to create your username and password.     Your access code is: [email or web address]  Expires: 2018 11:03 PM     Your access code will  in 90 days. If you need help or a new code, please call your [facility] clinic or [phone].        Care EveryWhere ID     This is your Care EveryWhere ID. This could be used by other organizations to access your [facility] medical records  [id number]        Equal Access to Services     [facility] : Dana Tamayo, mai weber, hue salmon, francesca novak . So [facility] [phone].    ATENCIÓN: Si habla español, tiene a dumont disposición servicios gratuitos de asistencia lingüística. Llame al [phone].    We comply with applicable federal civil rights laws and [place] laws. We do not discriminate on the basis of race, color, national origin, age, disability, sex, sexual orientation, or gender identity.            After Visit Summary       This is your record. Keep this with you and show to your community pharmacist(s) and doctor(s) at your next visit.                  "

## 2018-06-19 NOTE — DISCHARGE INSTRUCTIONS
*Follow up with resources provided       Discharge Instructions  Mental Health Concerns    You were seen today for mental health concerns, such as depression, anxiety, or suicidal thinking. Your provider feels that you do not require hospitalization at this time. However, your symptoms may become worse, and you may need to return to the Emergency Department. Most treatments of depression and suicidal thoughts are a process rather than a single intervention.  Medications and counseling can take several weeks or more to help.    Generally, every Emergency Department visit should have a follow-up clinic visit with either a primary or a specialty clinic/provider. Please follow-up as instructed by your emergency provider today.    By accepting these discharge instructions:    You promise to not harm yourself or others.    You agree that if you feel you are becoming unable to keep that promise, you will do something to help yourself before you do anything to harm yourself or others.     You agree to keep any safety plan arranged on your visit here today.    You agree to take any medication prescribed or recommended by your provider.    If you are getting worse, you can contact a friend or a family member, contact your counselor or family provider, contact a crisis line, or other options discussed with the provider or therapist today.    At any time, you can call 911 and return to the Emergency Department for more help.    You understand that follow-up is essential to your treatment, and you will make and keep appointments recommended on your visit today.    How to improve your mental health and prevent suicide:    Involve others by letting family, friends, counselors know.  Do not isolate yourself.    Avoid alcohol or drugs. Remove weapons, poisons from your home.    Try to stick to routines for eating, sleeping and getting regular exercise.      Try to get into sunlight. Bright natural light not only treats seasonal  affective disorder but also depression.    Increase safe activities that you enjoy.    If you feel worse, contact [phone] ([phone]), or call 911, or your primary provider/counselor for additional assistance.    If you were given a prescription for medicine here today, be sure to read all of the information (including the package insert) that comes with your prescription.  This will include important information about the medicine, its side effects, and any warnings that you need to know about.  The pharmacist who fills the prescription can provide more information and answer questions you may have about the medicine.  If you have questions or concerns that the pharmacist cannot address, please call or return to the Emergency Department.   Remember that you can always come back to the Emergency Department if you are not able to see your regular provider in the amount of time listed above, if you get any new symptoms, or if there is anything that worries you.

## 2018-06-19 NOTE — ED NOTES
Patient awaiting in lobby for discharge information, patient states he needs to charge his phone and does not want to wait in the room.

## 2018-07-21 ENCOUNTER — HOSPITAL ENCOUNTER (EMERGENCY)
Facility: CLINIC | Age: 23
Discharge: HOME | End: 2018-07-21
Attending: NURSE PRACTITIONER | Admitting: NURSE PRACTITIONER

## 2018-07-21 VITALS
DIASTOLIC BLOOD PRESSURE: 88 MMHG | SYSTOLIC BLOOD PRESSURE: 157 MMHG | TEMPERATURE: 99 F | OXYGEN SATURATION: 99 % | RESPIRATION RATE: 16 BRPM

## 2018-07-21 DIAGNOSIS — K13.79 MOUTH PAIN: ICD-10-CM

## 2018-07-21 DIAGNOSIS — K08.89 PAIN, DENTAL: ICD-10-CM

## 2018-07-21 PROCEDURE — 64400 NJX AA&/STRD TRIGEMINAL NRV: CPT

## 2018-07-21 PROCEDURE — 99283 EMERGENCY DEPT VISIT LOW MDM: CPT | Mod: 25

## 2018-07-21 RX ORDER — TRAMADOL HYDROCHLORIDE 50 MG/1
50 TABLET ORAL EVERY 8 HOURS PRN
Qty: 6 TABLET | Refills: 0 | Status: SHIPPED | OUTPATIENT
Start: 2018-07-21 | End: 2018-09-22

## 2018-07-21 RX ORDER — CLINDAMYCIN HCL 300 MG
300 CAPSULE ORAL 3 TIMES DAILY
Qty: 30 CAPSULE | Refills: 0 | Status: SHIPPED | OUTPATIENT
Start: 2018-07-21 | End: 2018-07-31

## 2018-07-21 ASSESSMENT — ENCOUNTER SYMPTOMS
FEVER: 0
NUMBNESS: 0
CHILLS: 0
WOUND: 0

## 2018-07-21 NOTE — ED AVS SNAPSHOT
[facility] Emergency Department    [address]    [facility] [address]    Phone:  [phone]    Fax:  [phone]                                       Tariq Rodriguez   MRN: [id number]    Department:  [facility] Emergency Department   Date of Visit:  7/21/2018           Patient Information     Date Of Birth          1995        Your diagnoses for this visit were:     Pain, dental     Mouth pain        You were seen by Lloyd Bledsoe APRN CNP.      Follow-up Information     Follow up with dentist list given.        Discharge Instructions       Take ibuprofen 600 mg every 6 hours for the next 3-5 days or until followup with dentist. If given other medications used as directed.  Use Oil of Clove topically. If given antibiotics make sure and finish the entire course. Ultimately you need to follow up with your dentist. If you cannot get into your dentist in the next couple of days I have given you a list of other options for dental care. You will need to get further pain management from your dentist. Return to emergency room if you develop high fevers, difficulty breathing, shortness of breath, the inability to swallow your own saliva, or for other concerns.    Emergency Dental Care  [email or web address]   [address] [facility] [address]   Directions   [phone]    Emergency Dental Services  [email or web address]  Emergency Dental Clinic You Can Rely On. Open 24 Hours. Call Now.  [address] [facility] [address]   Directions   [phone]    [facility]  Address: [address] [place] [address], [address], [place] [address]   Phone: [phone]    Dental Clinics Accepting [facility]    Child and Teen Checkups  [facility]  [phone]    [facility]  [address] [facility] [address]  [place], [place]  [phone]    The [address]  [phone]    [facility]  [address] [place] [address]  [place]  [place]  [phone]    [facility]  [address] [facility]  [phone]    [facility]  [address] [facility]  [phone]    [facility]  [address]  [phone]    [facility]  [address], [address]  [place]  [phone]    [place] [facility]  [place]  [phone]    [facility]  [address] [facility]  [phone]    [facility]  [address] [facility]  [phone]    [facility]  [address] [facility]  [phone]    [facility]  [address] [facility]  [phone]    [facility]  [address] [facility]  [phone]    ADDITIONAL RESOURCES    [facility]  [phone]    [facility]  [phone]    [facility]  [phone]    This web site contains many locations and information about what services they provide:    [email or web address]-[email or web address] [email or web address].[email or web address].Sphere 3d/      24 Hour Appointment Hotline       To make an appointment at any [facility], call [phone] ([phone]). If you don't have a family doctor or clinic, we will help you find one. [facility] are conveniently located to serve the needs of you and your family.             Review of your medicines      START taking        Dose / Directions Last dose taken    clindamycin 300 MG capsule   Commonly known as:  CLEOCIN   Dose:  300 mg   Quantity:  30 capsule        Take 1 capsule (300 mg) by mouth 3 times daily for 10 days   Refills:  0        traMADol 50 MG tablet   Commonly known as:  ULTRAM   Dose:  50 mg   Quantity:  6 tablet        Take 1 tablet (50 mg) by mouth every 8 hours as needed for pain   Refills:  0          Our records show that you are taking the medicines listed below. If these are incorrect, please call your family doctor or clinic.        Dose /  Directions Last dose taken    promethazine-codeine 6.25-10 MG/5ML syrup   Commonly known as:  PHENERGAN with codeine   Dose:  5 mL        Take 5 mLs by mouth 4 times daily as needed for cough   Refills:  0        SEROQUEL PO   Dose:  250 mg        Take 250 mg by mouth   Refills:  0                Information about OPIOIDS     PRESCRIPTION OPIOIDS: WHAT YOU NEED TO KNOW   We gave you an opioid (narcotic) pain medicine. It is important to manage your pain, but opioids are not always the best choice. You should first try all the other options your care team gave you. Take this medicine for as short a time (and as few doses) as possible.     These medicines have risks:    DO NOT drive when on new or higher doses of pain medicine. These medicines can affect your alertness and reaction times, and you could be arrested for driving under the influence (DUI). If you need to use opioids long-term, talk to your care team about driving.    DO NOT operate heave machinery    DO NOT do any other dangerous activities while taking these medicines.     DO NOT drink any alcohol while taking these medicines.      If the opioid prescribed includes acetaminophen, DO NOT take with any other medicines that contain acetaminophen. Read all labels carefully. Look for the word  acetaminophen  or  Tylenol.  Ask your pharmacist if you have questions or are unsure.    You can get addicted to pain medicines, especially if you have a history of addiction (chemical, alcohol or substance dependence). Talk to your care team about ways to reduce this risk.    Store your pills in a secure place, locked if possible. We will not replace any lost or stolen medicine. If you don t finish your medicine, please throw away (dispose) as directed by your pharmacist. The [place] Pollution Control Agency has more information about safe disposal: https://www.pca.state.[place]/living-green/managing-unwanted-medications.     All opioids tend to cause constipation.  Drink plenty of water and eat foods that have a lot of fiber, such as fruits, vegetables, prune juice, apple juice and high-fiber cereal. Take a laxative (Miralax, milk of magnesia, Colace, Senna) if you don t move your bowels at least every other day.         Prescriptions were sent or printed at these locations (2 Prescriptions)                   Other Prescriptions                Printed at Department/Unit printer (2 of 2)         clindamycin (CLEOCIN) 300 MG capsule               traMADol (ULTRAM) 50 MG tablet                Orders Needing Specimen Collection     None      Pending Results     No orders found from 7/19/2018 to 7/22/2018.            Pending Culture Results     No orders found from 7/19/2018 to 7/22/2018.            Pending Results Instructions     If you had any lab results that were not finalized at the time of your Discharge, you can call the ED Lab Result RN at [phone]. You will be contacted by this team for any positive Lab results or changes in treatment. The nurses are available 7 days a week from 10A to 6:30P.  You can leave a message 24 hours per day and they will return your call.        Test Results From Your Hospital Stay               Clinical Quality Measure: Blood Pressure Screening     Your blood pressure was checked while you were in the emergency department today. The last reading we obtained was  BP: 157/88 . Please read the guidelines below about what these numbers mean and what you should do about them.  If your systolic blood pressure (the top number) is less than 120 and your diastolic blood pressure (the bottom number) is less than 80, then your blood pressure is normal. There is nothing more that you need to do about it.  If your systolic blood pressure (the top number) is 120-139 or your diastolic blood pressure (the bottom number) is 80-89, your blood pressure may be higher than it should be. You should have your blood pressure rechecked within a year by a primary  "care provider.  If your systolic blood pressure (the top number) is 140 or greater or your diastolic blood pressure (the bottom number) is 90 or greater, you may have high blood pressure. High blood pressure is treatable, but if left untreated over time it can put you at risk for heart attack, stroke, or kidney failure. You should have your blood pressure rechecked by a primary care provider within the next 4 weeks.  If your provider in the emergency department today gave you specific instructions to follow-up with your doctor or provider even sooner than that, you should follow that instruction and not wait for up to 4 weeks for your follow-up visit.        Thank you for choosing [facility]       Thank you for choosing [facility] for your care. Our goal is always to provide you with excellent care. Hearing back from our patients is one way we can continue to improve our services. Please take a few minutes to complete the written survey that you may receive in the mail after you visit with us. Thank you!        [employer] Information     [employer] lets you send messages to your doctor, view your test results, renew your prescriptions, schedule appointments and more. To sign up, go to [email or web address] . Click on \"Log in\" on the left side of the screen, which will take you to the Welcome page. Then click on \"Sign up Now\" on the right side of the page.     You will be asked to enter the access code listed below, as well as some personal information. Please follow the directions to create your username and password.     Your access code is: [email or web address]  Expires: 2018 11:03 PM     Your access code will  in 90 days. If you need help or a new code, please call your [facility] clinic or [phone].        Care EveryWhere ID     This is your Care EveryWhere ID. This could be used by other organizations to access your [facility] medical records  [id number]        Equal Access to Services     DEMARCO FARNSWORTH: Dana chong " nixon Tamayo, mai weber, hue salmon, francesca tidwell. So [facility] [phone].    ATENCIÓN: Si habla español, tiene a dumont disposición servicios gratuitos de asistencia lingüística. Llame al [phone].    We comply with applicable federal civil rights laws and [place] laws. We do not discriminate on the basis of race, color, national origin, age, disability, sex, sexual orientation, or gender identity.            After Visit Summary       This is your record. Keep this with you and show to your community pharmacist(s) and doctor(s) at your next visit.

## 2018-07-21 NOTE — ED AVS SNAPSHOT
[facility] Emergency Department    [address]    [facility] [address]    Phone:  [phone]    Fax:  [phone]                                       Tariq Rodriguez   MRN: [id number]    Department:  [facility] Emergency Department   Date of Visit:  7/21/2018           After Visit Summary Signature Page     I have received my discharge instructions, and my questions have been answered. I have discussed any challenges I see with this plan with the nurse or doctor.    ..........................................................................................................................................  Patient/Patient Representative Signature      ..........................................................................................................................................  Patient Representative Print Name and Relationship to Patient    ..................................................               ................................................  Date                                            Time    ..........................................................................................................................................  Reviewed by Signature/Title    ...................................................              ..............................................  Date                                                            Time

## 2018-07-21 NOTE — ED PROVIDER NOTES
History     Chief Complaint:    Dental Pain      HPI   Tariq Rodriguez is a 23 year old male who presents to the ED today for evaluation of dental pain. The patient states that he has had lower right sided dental pain for the last three weeks now. He reports that the pain has been worse with talking or eating and he has been unable to sleep due to the pain. He has been using Ibuprofen, Asprin, and Aleve for his pain with no relief of his symptoms and presents to the ED today with concerns for worsening pain. Of note, the patient states that he has not yet seen a dentist for this dental problem.     Allergies:  Penicillins   Robitussin   Tylenol     Medications:    Phenergan with codeine   Seroquel      Past Medical History:    ADHD   Asthma   Bilateral presbyopia   Bipolar 1 disorder   Goiter   Hypertension   Pseudofolliculitis barbae    Past Surgical History:    History reviewed. No pertinent past surgical history.     Family History:    Hypertension     Social History:  Marital Status: single   Presents to the ED alone   Tobacco Use: current every day smoker   Alcohol Use: no   [facility]     Review of Systems   Constitutional: Negative for chills and fever.   HENT: Positive for dental problem.    Skin: Negative for wound.   Neurological: Negative for numbness.   All other systems reviewed and are negative.      Physical Exam   First Vitals:  BP: 157/88  Heart Rate: 76  Temp: 99  F (37.2  C)  Resp: 16  SpO2: 99 %      Physical Exam  General: Well-nourished, Mild  discomfort  Eyes: PERRL, conjunctivae pink no scleral icterus or conjunctival injection  ENT:  Moist mucus membranes.  Tooth # 30 with non-acute chip and tenderness.  No drainage.  No abscess along gumline.  No cheek or submandibular edema.  No trismus.  Normal voice.  Respiratory:  Normal respiratory effort. No cough  CV: Normal rate   Musculoskeletal: No peripheral edema or calf tenderness  Neuro: Alert and oriented to  person/place/time  Skin: Warm and dry. Normal appearance of visualized exposed skin  Psychiatric: Affect normal. Normal personal interaction. Good eye contact.    Emergency Department Course   Procedures:  Dental Block:  Right side apcial block: for teeth #30 and # 3  Indications: Dental pain.    Anesthesia: 0.5 % Marcaine with epinephrine 1.8ml x 2.   Description: Tooth # 30 and # 3 were blocked  The patient tolerated the procedure well and there were no complication. The patient had rapid improvement of his dental pain after the procedure     Emergency Department Course:  Nursing notes and vitals reviewed.  (1726) I performed an exam of the patient as documented above.    (1734) I performed a dental block on the patient, as noted above.   Findings and plan explained to the patient. Patient discharged home with instructions regarding supportive care, medications, and reasons to return. The importance of close follow-up was reviewed. The patient was prescribed Clindamycin and Tramadol.    Impression & Plan    Medical Decision Making:  The patient presents with a toothache.  There is no abscess detected around the tooth amenable to incision and drainage.  The differential diagnosis includes: cracked tooth syndrome, pulpitis, sub-apical abscess, amongst others.  There is no evidence of  infection, significant facial swelling, or Yaron's angina. There are no posterior pharyngeal space infections detected. Appropriate use of Ibuprofen discussed. Ultram and Clindamycin given. Dental clinic list given.  Follow up with a dentist/endodontist in the coming days is indicated for further work up and treatment.    Diagnosis:    ICD-10-CM    1. Pain, dental K08.89    2. Mouth pain K13.79        Disposition:  discharged to home    Discharge Medications:  New Prescriptions    CLINDAMYCIN (CLEOCIN) 300 MG CAPSULE    Take 1 capsule (300 mg) by mouth 3 times daily for 10 days    TRAMADOL (ULTRAM) 50 MG TABLET    Take 1 tablet (50  mg) by mouth every 8 hours as needed for pain         I, Radah Gottlieb, am serving as a scribe on 7/21/2018 at 5:26 PM to personally document services performed by CHERISE Montoya, CNP based on my observations and the provider's statements to me.   7/21/2018   [facility] EMERGENCY DEPARTMENT       Lloyd Bledsoe APRN CNP  07/21/18 5850

## 2018-07-21 NOTE — ED TRIAGE NOTES
Pt reports 3 weeks of rt side dental pain, states he has a hole in his tooth causing the pain. Pt does not seen a dentist. Pt reports pain is worse with talking or eating. Pt has been taking Ibuprofen and Aspirin and using Orajel with no relief. Pt unable to sleep do to pain.

## 2018-07-21 NOTE — DISCHARGE INSTRUCTIONS
Take ibuprofen 600 mg every 6 hours for the next 3-5 days or until followup with dentist. If given other medications used as directed.  Use Oil of Clove topically. If given antibiotics make sure and finish the entire course. Ultimately you need to follow up with your dentist. If you cannot get into your dentist in the next couple of days I have given you a list of other options for dental care. You will need to get further pain management from your dentist. Return to emergency room if you develop high fevers, difficulty breathing, shortness of breath, the inability to swallow your own saliva, or for other concerns.    Emergency Dental Care  [email or web address]   [address] [place] [address], [place]   Directions   [phone]    Emergency Dental Services  [email or web address]  Emergency Dental Clinic You Can Rely On. Open 24 Hours. Call Now.  [address] [facility] [facility] [address], [place]   Directions   [phone]    [facility]  Address: [address] [place] [address], [address], [place] [address]   Phone: [phone]    Dental Clinics Accepting [facility]    Child and Teen Checkups  [facility]  [phone]    [facility]  [address] [facility] [address]  [place], [place]  [phone]    The [address]  [phone]    [facility]  [address] [facility]  [phone]    [facility]  [address] [facility]  [phone]    [facility]  [address] [facility]  [phone]    [facility]  [address] [facility]  [phone]    [facility]  [address], [address] [facility]  [phone]    [place] [facility]  [place]  [phone]    [address]  [phone]    [facility]  [address]  [phone]    [facility]  [address] [facility]  [phone]    [facility]  [address] [facility]  [phone]    [facility]  [address] [facility]  [phone]    ADDITIONAL RESOURCES    [facility]  [phone]    [facility]  [phone]    [facility]  [phone]    This web site contains many locations and information about what services they provide:    [email or web address]

## 2018-09-22 ENCOUNTER — APPOINTMENT (OUTPATIENT)
Dept: GENERAL RADIOLOGY | Facility: CLINIC | Age: 23
End: 2018-09-22
Attending: EMERGENCY MEDICINE
Payer: MEDICAID

## 2018-09-22 ENCOUNTER — HOSPITAL ENCOUNTER (EMERGENCY)
Facility: CLINIC | Age: 23
Discharge: HOME | End: 2018-09-22
Attending: EMERGENCY MEDICINE | Admitting: EMERGENCY MEDICINE
Payer: MEDICAID

## 2018-09-22 VITALS — OXYGEN SATURATION: 96 % | TEMPERATURE: 99.2 F | SYSTOLIC BLOOD PRESSURE: 131 MMHG | DIASTOLIC BLOOD PRESSURE: 76 MMHG

## 2018-09-22 DIAGNOSIS — M79.641 BILATERAL HAND PAIN: ICD-10-CM

## 2018-09-22 DIAGNOSIS — J18.9 PNEUMONIA OF RIGHT LOWER LOBE DUE TO INFECTIOUS ORGANISM: ICD-10-CM

## 2018-09-22 DIAGNOSIS — J45.21 EXACERBATION OF INTERMITTENT ASTHMA, UNSPECIFIED ASTHMA SEVERITY: ICD-10-CM

## 2018-09-22 DIAGNOSIS — M79.642 BILATERAL HAND PAIN: ICD-10-CM

## 2018-09-22 PROCEDURE — 25000125 ZZHC RX 250: Performed by: EMERGENCY MEDICINE

## 2018-09-22 PROCEDURE — 99283 EMERGENCY DEPT VISIT LOW MDM: CPT | Mod: 25

## 2018-09-22 PROCEDURE — 73130 X-RAY EXAM OF HAND: CPT | Mod: RT

## 2018-09-22 PROCEDURE — 71046 X-RAY EXAM CHEST 2 VIEWS: CPT

## 2018-09-22 PROCEDURE — 73130 X-RAY EXAM OF HAND: CPT | Mod: LT

## 2018-09-22 PROCEDURE — 94640 AIRWAY INHALATION TREATMENT: CPT

## 2018-09-22 RX ORDER — AZITHROMYCIN 250 MG/1
TABLET, FILM COATED ORAL
Qty: 6 TABLET | Refills: 0 | Status: SHIPPED | OUTPATIENT
Start: 2018-09-22 | End: 2018-09-27

## 2018-09-22 RX ORDER — PROMETHAZINE HYDROCHLORIDE AND CODEINE PHOSPHATE 6.25; 1 MG/5ML; MG/5ML
5 SYRUP ORAL 4 TIMES DAILY PRN
Qty: 280 ML | Refills: 0 | Status: SHIPPED | OUTPATIENT
Start: 2018-09-22

## 2018-09-22 RX ORDER — IPRATROPIUM BROMIDE AND ALBUTEROL SULFATE 2.5; .5 MG/3ML; MG/3ML
3 SOLUTION RESPIRATORY (INHALATION) ONCE
Status: COMPLETED | OUTPATIENT
Start: 2018-09-22 | End: 2018-09-22

## 2018-09-22 RX ORDER — ALBUTEROL SULFATE 90 UG/1
2 AEROSOL, METERED RESPIRATORY (INHALATION) EVERY 4 HOURS PRN
Qty: 1 INHALER | Refills: 0 | Status: SHIPPED | OUTPATIENT
Start: 2018-09-22

## 2018-09-22 RX ADMIN — IPRATROPIUM BROMIDE AND ALBUTEROL SULFATE 3 ML: .5; 3 SOLUTION RESPIRATORY (INHALATION) at 08:00

## 2018-09-22 ASSESSMENT — ENCOUNTER SYMPTOMS
CHILLS: 1
RHINORRHEA: 1
MYALGIAS: 1
COUGH: 1
DIAPHORESIS: 1
FEVER: 1
SHORTNESS OF BREATH: 1

## 2018-09-22 NOTE — ED PROVIDER NOTES
"  History     Chief Complaint:  Cough    HPI   Tariq Rodriguez is a 23 year old male, with history of asthma and bipolar 1 disorder amongst others as noted below, not currently on any prescribed medications including no inhaler, who presents alone to the emergency department for evaluation of a productive cough and associated shortness of breath, chills, generalized malaise, rhinorrhea, diaphoresis, and low grade fever as high as 99.9F, which all began 3 days ago. Patient endorses brown-green like sputum. Patient has been using Delsym cough syrup and ibuprofen with minimal relief, last dose of ibuprofen was at 0400 this morning where patient took 3-250 mg tablets. Patient endorses he has been drinking lots of fluids and denies any recent travel or known ill contacts.    Patient also endorses right hand pain for \"a while\" and notes he has been experiencing severe pain in his entire right hand for the last few weeks. Patient reports he has not been evaluated for this prior to today's visit, but chart review shows that patient has been evaluated in the ED for this pain in October 2017, where he described \"achy\" pain in both wrists and was told he may have tendonitis or carpal tunnel. 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 He notes that whenever he hit his hand against something, he feels severe pain and intermittently has difficulty grabbing items secondary to the pain. He reports he typically does minimal to no interventions for this pain, but may ice the area, but has had no relief or resolution of the pain.     Allergies:  Penicillins  Robitussin  Tylenol     Medications:    The patient is not currently taking any prescribed medications.     Past Medical History:    ADHD  Asthma  Bilateral presbyopia  Bipolar 1 disorder  Goiter  Hypertension  Pseudofolliculitis barbae  Drug-seeking behavior  Drug idiosyncratic efect    Past Surgical History:    History reviewed. No pertinent past surgical " history.     Family History:    Hypertension    Social History:  The patient was accompanied to the ED alone.  Smoking Status: Current every day smoker  Smokeless Tobacco: No  Alcohol Use: No   Marital Status:  Single [1]     Review of Systems   Constitutional: Positive for chills, diaphoresis and fever.   HENT: Positive for rhinorrhea.    Respiratory: Positive for cough and shortness of breath.    Musculoskeletal: Positive for myalgias (Generalized).        Right hand pain   All other systems reviewed and are negative.    Physical Exam   Vitals:  Patient Vitals for the past 24 hrs:   BP Temp Temp src Heart Rate SpO2   09/22/18 0730 131/76 99.2  F (37.3  C) Oral 89 96 %      Physical Exam   Constitutional: He appears well-developed and well-nourished.   HENT:   Right Ear: External ear normal.   Left Ear: External ear normal.   Mouth/Throat: Oropharynx is clear and moist. No oropharyngeal exudate.   TM's clear bilaterally   Eyes: Conjunctivae are normal. Pupils are equal, round, and reactive to light. No scleral icterus.   Neck: Normal range of motion. Neck supple.   Cardiovascular: Normal rate, regular rhythm, normal heart sounds and intact distal pulses.  Exam reveals no gallop and no friction rub.    No murmur heard.  Pulmonary/Chest: No respiratory distress. He has no wheezes. He has no rales.   Decreased breath sounds bilaterally   Abdominal: Soft. Bowel sounds are normal. He exhibits no distension and no mass. There is no tenderness.   Musculoskeletal: Normal range of motion. He exhibits tenderness. He exhibits no edema.   B hands with TTP diffusely from wrist down to fingers, no obvious trauma or redness or swelling.  2+ pulses in BUE with normal color and temperature.  ROM of BUE normal.  Subjective paresthesia to light touch in B hands.   Lymphadenopathy:     He has no cervical adenopathy.   Neurological: He is alert.   Skin: Skin is warm and dry. No rash noted.   Psychiatric: He has a normal mood and  affect.     Emergency Department Course     Imaging:  Radiology findings were communicated with the patient who voiced understanding of the findings.  XR Hand Bilateral:  IMPRESSION: Three views of the right and left hands are performed.  Views of the right hand show no evidence of fracture or dislocation.  Joint spaces appear preserved. No periarticular erosions are  appreciated. Carpal bones appear intact. Views of the left hand  demonstrate a small accessory ossicle at the MCP joint of the middle  finger. No fracture or dislocation. Joint spaces appear preserved. No  periarticular erosions are appreciated. Carpal bones are intact. No  significant degenerative changes are evident.  Reading per radiology.    Chest XR, PA & LAT:  IMPRESSION: Two views of the chest are performed. Linear opacity on  the frontal view right lower lobe could be a confluence of shadows,  but atelectasis or subtle infiltrate is possible. This is not well  visualized on prior exam, but appears new since the prior chest x-ray.  Left lung is clear. No pneumothorax or pleural effusions. Heart is  normal in size.  Reading per radiology.     Interventions:  0800 Duoneb 3 mL Nebulization     Emergency Department Course:  Nursing notes and vitals reviewed.  The patient was sent for a XR Hand Bilateral, Chest XR, PA & LAT while in the emergency department, results above.      7:35 AM: I performed an exam of the patient as documented above. History obtained from patient.  8:11 AM: Rechecked patient. Auscultated patient's lungs and patient has improved airflow bilaterally at the bases compared to initial exam.   9:00 AM: Rechecked patient. Discussed plan of care and patient will be discharged home.     I discussed the treatment plan with the patient. They expressed understanding of this plan and consented to discharge. They will be discharged home with instructions for care and follow up. In addition, the patient will return to the emergency  department if their symptoms persist, worsen, if new symptoms arise or if there is any concern.  All questions were answered.     I personally reviewed the laboratory results with the Patient and answered all related questions prior to discharge.    Impression & Plan      Medical Decision Making:  Patient presents with cough, low grade fever, mucous production and chest pain for the last several days. Patient also complains of bilateral hand pain and numbness that has been ongoing for quite some time. Patient on exam had decreased breath sounds bilaterally, but no wheezing was heard. He was afebrile here and oxygen saturation was normal. I obtained an x-ray after giving him one Duoneb. His breath sounds did open up quite a bit after the Duoneb. X-ray show possibly a right lower lobe infiltrate, so we will put him on medication for that. As far as his bilateral hand pain and numbness that has been ongoing, I did review his chart and see that he was seen here six months ago for wrist pain. At that point, they felt that he might have carpal tunnel or tendonitis. I am going to refer him to orthopedic for that. He has good pulses bilaterally. There is no obvious swelling or redness or tenderness. His strength is decreased due to pain and numbness, but I don't find any other deficits at this point. He has no neck pain or shoulder pain to suggest this is cervical radiculopathy. I did instruct him that he needs to absolutely follow up with orthopedics to get this evaluated as it is still bothering him. He does perform repetitive motions at work and he works with his hands on both sides equally, even though he is right hand dominant. He voiced understanding the need for follow up and is asked to start antibiotics today. He should start his albuterol inhaler eery 4 hours and taper down as tolerated, but for today, I did instruct him that he should do them every 4 hours. If symptoms worsen, he should return. I did refer him to  [facility] for follow up.     Diagnosis:    ICD-10-CM    1. Pneumonia of right lower lobe due to infectious organism (H) J18.1    2. Exacerbation of intermittent asthma, unspecified asthma severity J45.21    3. Bilateral hand pain M79.641     M79.642         Disposition:   Discharged.    Discharge Medications:  New Prescriptions    ALBUTEROL (PROAIR HFA) 108 (90 BASE) MCG/ACT INHALER    Inhale 2 puffs into the lungs every 4 hours as needed for shortness of breath / dyspnea    AZITHROMYCIN (ZITHROMAX Z-RAMONA) 250 MG TABLET    Two tablets on the first day, then one tablet daily for the next 4 days    PROMETHAZINE-CODEINE (PHENERGAN WITH CODEINE) 6.25-10 MG/5ML SYRUP    Take 5 mLs by mouth 4 times daily as needed for cough       Scribe Disclosure:  Cinthya SANDERS, am serving as a scribe at 7:35 AM on 9/22/2018 to document services personally performed by Edy Nielson MD, based on my observations and the provider's statements to me.  9/22/2018   [facility] EMERGENCY DEPARTMENT       Edy Nielson MD  09/22/18 9651

## 2018-09-22 NOTE — ED NOTES
Pt ready to discharge by self. AVS reviewed and f/u. Sent with scripts and work note. No further needs at this time.

## 2018-09-22 NOTE — ED AVS SNAPSHOT
[facility] Emergency Department    [address]    [facility] [address]    Phone:  [phone]    Fax:  [phone]                                       Tariq Rodriguez   MRN: [id number]    Department:  [facility] Emergency Department   Date of Visit:  9/22/2018           Patient Information     Date Of Birth          1995        Your diagnoses for this visit were:     Pneumonia of right lower lobe due to infectious organism (H)     Exacerbation of intermittent asthma, unspecified asthma severity     Bilateral hand pain        You were seen by Edy Nielson MD.      Follow-up Information     Follow up with Clinic[facility]. Go in 2 days.    Contact information:    [address] [facility] [address] [phone]          Follow up with [facility]. Go in 1 week.    Contact information:    [address], [facility] [address]  [facility] [address]  [phone]          Discharge Instructions       Discharge Instructions  Bronchitis, Pneumonia, Bronchospasm    You were seen today for a chest infection or inflammation. If your doctor decided this was due to a bacterial infection, you may need an antibiotic. Sometimes these are caused by a virus, and then an antibiotic will not help.     Return to the Emergency Department if:    Your breathing gets much worse.    You are very weak, or feel much more ill.    You develop new symptoms, such as chest pain.    You cough up blood.    You are vomiting enough that you can t keep fluids or your medicine down.    What can I do to help myself?    Fill any prescriptions the doctor gave you and take them right away--especially antibiotics. Be sure to finish the whole antibiotic prescription.    You may be given a prescription for an inhaler, which can help loosen tight air passages.  Use this as needed, but not more often than directed. Inhalers work much better when used with a spacer.     You may be  "given a prescription for a steroid to reduce inflammation. Used long-term, these can have many serious side effects, but for short courses these do not happen. You may notice restlessness or increased appetite.        You may use non-prescription cough or cold medicines. Cough medicines may help, but don t make the cough go away completely.     Avoid smoke, because this can make your symptoms worse. If you smoke, this may be a good time to quit! Consider using nicotine lozenges, gum, or patches to reduce cravings.     If you have a fever, Tylenol  (acetaminophen), Motrin  (ibuprofen), or Advil  (ibuprofen) may help bring fever down and may help you feel more comfortable. Be sure to read and follow the package directions, and ask your doctor if you have questions.    Be sure to get your flu shot each year.  The pneumonia shot can help prevent pneumonia.  Probiotics: If you have been given an antibiotic, you may want to also take a probiotic pill or eat yogurt with live cultures. Probiotics have \"good bacteria\" to help your intestines stay healthy. Studies have shown that probiotics help prevent diarrhea and other intestine problems (including C. diff infection) when you take antibiotics. You can buy these without a prescription in the pharmacy section of the store.     If your doctor has told you to follow-up at your clinic, be sure to call right away and go to your appointment.  If there is any problem with keeping your appointment, call your doctor or return to the Emergency Department.    If you were given a prescription for medicine here today, be sure to read all of the information (including the package insert) that comes with your prescription.  This will include important information about the medicine, its side effects, and any warnings that you need to know about.  The pharmacist who fills the prescription can provide more information and answer questions you may have about the medicine.  If you have " questions or concerns that the pharmacist cannot address, please call or return to the Emergency Department.     Opioid Medication Information    Pain medications are among the most commonly prescribed medicines, so we are including this information for all our patients. If you did not receive pain medication or get a prescription for pain medicine, you can ignore it.     You may have been given a prescription for an opioid (narcotic) pain medicine and/or have received a pain medicine while here in the Emergency Department. These medicines can make you drowsy or impaired. You must not drive, operate dangerous equipment, or engage in any other dangerous activities while taking these medications. If you drive while taking these medications, you could be arrested for DUI, or driving under the influence. Do not drink any alcohol while you are taking these medications.     Opioid pain medications can cause addiction. If you have a history of chemical dependency of any type, you are at a higher risk of becoming addicted to pain medications.  Only take these prescribed medications to treat your pain when all other options have been tried. Take it for as short a time and as few doses as possible. Store your pain pills in a secure place, as they are frequently stolen and provide a dangerous opportunity for children or visitors in your house to start abusing these powerful medications. We will not replace any lost or stolen medicine.  As soon as your pain is better, you should flush all your remaining medication.     Many prescription pain medications contain Tylenol  (acetaminophen), including Vicodin , Tylenol #3 , Norco , Lortab , and Percocet .  You should not take any extra pills of Tylenol  if you are using these prescription medications or you can get very sick.  Do not ever take more than 3000 mg of acetaminophen in any 24 hour period.    All opioids tend to cause constipation. Drink plenty of water and eat foods that  have a lot of fiber, such as fruits, vegetables, prune juice, apple juice and high fiber cereal.  Take a laxative if you don t move your bowels at least every other day. Miralax , Milk of Magnesia, Colace , or Senna  can be used to keep you regular.      Remember that you can always come back to the Emergency Department if you are not able to see your regular doctor in the amount of time listed above, if you get any new symptoms, or if there is anything that worries you.          24 Hour Appointment Hotline       To make an appointment at any [facility], call [phone] ([phone]). If you don't have a family doctor or clinic, we will help you find one. [facility] clinics are conveniently located to serve the needs of you and your family.             Review of your medicines      START taking        Dose / Directions Last dose taken    albuterol 108 (90 Base) MCG/ACT inhaler   Commonly known as:  PROAIR HFA   Dose:  2 puff   Quantity:  1 Inhaler        Inhale 2 puffs into the lungs every 4 hours as needed for shortness of breath / dyspnea   Refills:  0        azithromycin 250 MG tablet   Commonly known as:  ZITHROMAX Z-RAMONA   Quantity:  6 tablet        Two tablets on the first day, then one tablet daily for the next 4 days   Refills:  0        promethazine-codeine 6.25-10 MG/5ML syrup   Commonly known as:  PHENERGAN with codeine   Dose:  5 mL   Quantity:  280 mL        Take 5 mLs by mouth 4 times daily as needed for cough   Refills:  0                Prescriptions were sent or printed at these locations (3 Prescriptions)                   Other Prescriptions                Printed at Department/Unit printer (3 of 3)         albuterol (PROAIR HFA) 108 (90 Base) MCG/ACT inhaler               azithromycin (ZITHROMAX Z-RAMONA) 250 MG tablet               promethazine-codeine (PHENERGAN WITH CODEINE) 6.25-10 MG/5ML syrup                Procedures and tests performed during your visit     Chest XR,  PA & LAT    XR  Hand Bilateral G/E 3 Views      Orders Needing Specimen Collection     None      Pending Results     No orders found from 9/20/2018 to 9/23/2018.            Pending Culture Results     No orders found from 9/20/2018 to 9/23/2018.            Pending Results Instructions     If you had any lab results that were not finalized at the time of your Discharge, you can call the ED Lab Result RN at [phone]. You will be contacted by this team for any positive Lab results or changes in treatment. The nurses are available 7 days a week from 10A to 6:30P.  You can leave a message 24 hours per day and they will return your call.        Test Results From Your Hospital Stay        9/22/2018  8:43 AM      Narrative     CHEST TWO VIEWS  9/22/2018 8:30 AM     HISTORY:  Cough.     COMPARISON: Chest x-ray 10/3/2017.        Impression     IMPRESSION: Two views of the chest are performed. Linear opacity on  the frontal view right lower lobe could be a confluence of shadows,  but atelectasis or subtle infiltrate is possible. This is not well  visualized on prior exam, but appears new since the prior chest x-ray.  Left lung is clear. No pneumothorax or pleural effusions. Heart is  normal in size.    STEVE DEL CASTILLO MD         9/22/2018  8:43 AM      Narrative     HAND BILATERAL THREE OR MORE VIEWS   9/22/2018 8:30 AM     HISTORY:  Pain.     COMPARISON: None.        Impression     IMPRESSION: Three views of the right and left hands are performed.  Views of the right hand show no evidence of fracture or dislocation.  Joint spaces appear preserved. No periarticular erosions are  appreciated. Carpal bones appear intact. Views of the left hand  demonstrate a small accessory ossicle at the MCP joint of the middle  finger. No fracture or dislocation. Joint spaces appear preserved. No  periarticular erosions are appreciated. Carpal bones are intact. No  significant degenerative changes are evident.    STEVE DEL CASTILLO MD                Clinical Quality  Measure: Blood Pressure Screening     Your blood pressure was checked while you were in the emergency department today. The last reading we obtained was  BP: 131/76 . Please read the guidelines below about what these numbers mean and what you should do about them.  If your systolic blood pressure (the top number) is less than 120 and your diastolic blood pressure (the bottom number) is less than 80, then your blood pressure is normal. There is nothing more that you need to do about it.  If your systolic blood pressure (the top number) is 120-139 or your diastolic blood pressure (the bottom number) is 80-89, your blood pressure may be higher than it should be. You should have your blood pressure rechecked within a year by a primary care provider.  If your systolic blood pressure (the top number) is 140 or greater or your diastolic blood pressure (the bottom number) is 90 or greater, you may have high blood pressure. High blood pressure is treatable, but if left untreated over time it can put you at risk for heart attack, stroke, or kidney failure. You should have your blood pressure rechecked by a primary care provider within the next 4 weeks.  If your provider in the emergency department today gave you specific instructions to follow-up with your doctor or provider even sooner than that, you should follow that instruction and not wait for up to 4 weeks for your follow-up visit.        Thank you for choosing [facility]       Thank you for choosing [facility] for your care. Our goal is always to provide you with excellent care. Hearing back from our patients is one way we can continue to improve our services. Please take a few minutes to complete the written survey that you may receive in the mail after you visit with us. Thank you!        [employer] Information     [employer] lets you send messages to your doctor, view your test results, renew your prescriptions, schedule appointments and more. To sign up, go to  [email or web address] . Click on \"Log in\" on the left side of the screen, which will take you to the Welcome page. Then click on \"Sign up Now\" on the right side of the page.     You will be asked to enter the access code listed below, as well as some personal information. Please follow the directions to create your username and password.     Your access code is: [email or web address]  Expires: 2018  9:12 AM     Your access code will  in 90 days. If you need help or a new code, please call your [facility] clinic or [phone].        Care EveryWhere ID     This is your Care EveryWhere ID. This could be used by other organizations to access your [facility] medical records  [id number]        Equal Access to Services     DEMARCO LOCKWOOD : Dana Tamayo, mai weber, hue samsonalkrissy salmon, francesca tidwell. So [facility] [phone].    ATENCIÓN: Si habla español, tiene a dumont disposición servicios gratuitos de asistencia lingüística. Kavin al [phone].    We comply with applicable federal civil rights laws and [place] laws. We do not discriminate on the basis of race, color, national origin, age, disability, sex, sexual orientation, or gender identity.            After Visit Summary       This is your record. Keep this with you and show to your community pharmacist(s) and doctor(s) at your next visit.                  "

## 2018-09-22 NOTE — ED AVS SNAPSHOT
[facility] Emergency Department    [address]    [facility] [address]    Phone:  [phone]    Fax:  [phone]                                       Tariq Rodriguez   MRN: [id number]    Department:  [facility] Emergency Department   Date of Visit:  9/22/2018           After Visit Summary Signature Page     I have received my discharge instructions, and my questions have been answered. I have discussed any challenges I see with this plan with the nurse or doctor.    ..........................................................................................................................................  Patient/Patient Representative Signature      ..........................................................................................................................................  Patient Representative Print Name and Relationship to Patient    ..................................................               ................................................  Date                                   Time    ..........................................................................................................................................  Reviewed by Signature/Title    ...................................................              ..............................................  Date                                               Time          22EPIC Rev 08/18

## 2018-09-22 NOTE — LETTER
September 22, 2018      To Whom It May Concern:      Tariq Rodriguez was seen in our Emergency Department today, 09/22/18.  I expect his condition to improve over the next 3 days.  He may return to work/school when improved.    Sincerely,        Edy Nielson MD

## 2018-09-22 NOTE — ED TRIAGE NOTES
Cough with mucous since Wednesday, worse at night. Congestion and chills. No current flu shot. C/O headache associated with cough and R hand swelling.

## 2019-01-22 ENCOUNTER — HOSPITAL ENCOUNTER (EMERGENCY)
Facility: CLINIC | Age: 24
Discharge: HOME | End: 2019-01-22
Attending: EMERGENCY MEDICINE | Admitting: EMERGENCY MEDICINE
Payer: COMMERCIAL

## 2019-01-22 VITALS
WEIGHT: 155 LBS | BODY MASS INDEX: 25.02 KG/M2 | SYSTOLIC BLOOD PRESSURE: 107 MMHG | TEMPERATURE: 98.4 F | OXYGEN SATURATION: 98 % | DIASTOLIC BLOOD PRESSURE: 80 MMHG | HEART RATE: 91 BPM | RESPIRATION RATE: 16 BRPM

## 2019-01-22 DIAGNOSIS — R19.7 DIARRHEA, UNSPECIFIED TYPE: ICD-10-CM

## 2019-01-22 DIAGNOSIS — R11.0 NAUSEA: ICD-10-CM

## 2019-01-22 DIAGNOSIS — K64.5 THROMBOSED EXTERNAL HEMORRHOIDS: ICD-10-CM

## 2019-01-22 LAB
ANION GAP SERPL CALCULATED.3IONS-SCNC: 8 MMOL/L (ref 3–14)
BASOPHILS # BLD AUTO: 0 10E9/L (ref 0–0.2)
BASOPHILS NFR BLD AUTO: 0.5 %
BUN SERPL-MCNC: 8 MG/DL (ref 7–30)
CALCIUM SERPL-MCNC: 8.6 MG/DL (ref 8.5–10.1)
CHLORIDE SERPL-SCNC: 109 MMOL/L (ref 94–109)
CO2 SERPL-SCNC: 26 MMOL/L (ref 20–32)
CREAT SERPL-MCNC: 0.97 MG/DL (ref 0.66–1.25)
DIFFERENTIAL METHOD BLD: NORMAL
EOSINOPHIL # BLD AUTO: 0.1 10E9/L (ref 0–0.7)
EOSINOPHIL NFR BLD AUTO: 1.1 %
ERYTHROCYTE [DISTWIDTH] IN BLOOD BY AUTOMATED COUNT: 12 % (ref 10–15)
GFR SERPL CREATININE-BSD FRML MDRD: >90 ML/MIN/{1.73_M2}
GLUCOSE SERPL-MCNC: 112 MG/DL (ref 70–99)
HCT VFR BLD AUTO: 42.1 % (ref 40–53)
HGB BLD-MCNC: 15 G/DL (ref 13.3–17.7)
IMM GRANULOCYTES # BLD: 0 10E9/L (ref 0–0.4)
IMM GRANULOCYTES NFR BLD: 0.2 %
LYMPHOCYTES # BLD AUTO: 2.3 10E9/L (ref 0.8–5.3)
LYMPHOCYTES NFR BLD AUTO: 37.1 %
MCH RBC QN AUTO: 32.8 PG (ref 26.5–33)
MCHC RBC AUTO-ENTMCNC: 35.6 G/DL (ref 31.5–36.5)
MCV RBC AUTO: 92 FL (ref 78–100)
MONOCYTES # BLD AUTO: 0.6 10E9/L (ref 0–1.3)
MONOCYTES NFR BLD AUTO: 9.9 %
NEUTROPHILS # BLD AUTO: 3.2 10E9/L (ref 1.6–8.3)
NEUTROPHILS NFR BLD AUTO: 51.2 %
NRBC # BLD AUTO: 0 10*3/UL
NRBC BLD AUTO-RTO: 0 /100
PLATELET # BLD AUTO: 205 10E9/L (ref 150–450)
POTASSIUM SERPL-SCNC: 3.6 MMOL/L (ref 3.4–5.3)
RBC # BLD AUTO: 4.58 10E12/L (ref 4.4–5.9)
SODIUM SERPL-SCNC: 143 MMOL/L (ref 133–144)
WBC # BLD AUTO: 6.2 10E9/L (ref 4–11)

## 2019-01-22 PROCEDURE — 85025 COMPLETE CBC W/AUTO DIFF WBC: CPT | Performed by: EMERGENCY MEDICINE

## 2019-01-22 PROCEDURE — 99284 EMERGENCY DEPT VISIT MOD MDM: CPT | Mod: 25

## 2019-01-22 PROCEDURE — 80048 BASIC METABOLIC PNL TOTAL CA: CPT | Performed by: EMERGENCY MEDICINE

## 2019-01-22 PROCEDURE — 46083 INC THROMBOSED HROID XTRNL: CPT

## 2019-01-22 PROCEDURE — 96375 TX/PRO/DX INJ NEW DRUG ADDON: CPT

## 2019-01-22 PROCEDURE — 96361 HYDRATE IV INFUSION ADD-ON: CPT

## 2019-01-22 PROCEDURE — 96374 THER/PROPH/DIAG INJ IV PUSH: CPT

## 2019-01-22 PROCEDURE — 25000128 H RX IP 250 OP 636: Performed by: EMERGENCY MEDICINE

## 2019-01-22 RX ORDER — KETOROLAC TROMETHAMINE 15 MG/ML
15 INJECTION, SOLUTION INTRAMUSCULAR; INTRAVENOUS ONCE
Status: COMPLETED | OUTPATIENT
Start: 2019-01-22 | End: 2019-01-22

## 2019-01-22 RX ORDER — OLANZAPINE 10 MG/1
10 TABLET ORAL AT BEDTIME
COMMUNITY

## 2019-01-22 RX ORDER — ONDANSETRON 4 MG/1
4 TABLET, ORALLY DISINTEGRATING ORAL EVERY 8 HOURS PRN
Qty: 10 TABLET | Refills: 0 | Status: SHIPPED | OUTPATIENT
Start: 2019-01-22 | End: 2019-01-25

## 2019-01-22 RX ORDER — ONDANSETRON 2 MG/ML
4 INJECTION INTRAMUSCULAR; INTRAVENOUS ONCE
Status: COMPLETED | OUTPATIENT
Start: 2019-01-22 | End: 2019-01-22

## 2019-01-22 RX ORDER — LIDOCAINE HYDROCHLORIDE AND EPINEPHRINE 10; 10 MG/ML; UG/ML
INJECTION, SOLUTION INFILTRATION; PERINEURAL
Status: DISCONTINUED
Start: 2019-01-22 | End: 2019-01-22 | Stop reason: HOSPADM

## 2019-01-22 RX ADMIN — KETOROLAC TROMETHAMINE 15 MG: 15 INJECTION, SOLUTION INTRAMUSCULAR; INTRAVENOUS at 04:24

## 2019-01-22 RX ADMIN — ONDANSETRON 4 MG: 2 INJECTION INTRAMUSCULAR; INTRAVENOUS at 04:24

## 2019-01-22 RX ADMIN — SODIUM CHLORIDE 1000 ML: 9 INJECTION, SOLUTION INTRAVENOUS at 04:27

## 2019-01-22 ASSESSMENT — ENCOUNTER SYMPTOMS
FEVER: 0
VOMITING: 1
BLOOD IN STOOL: 0
NAUSEA: 1
DIARRHEA: 1
ABDOMINAL PAIN: 1
DYSURIA: 0
WOUND: 1

## 2019-01-22 NOTE — ED AVS SNAPSHOT
[facility] Emergency Department  [address]  [facility] [address]  Phone:  [phone]  Fax:  [phone]                                    Tariq Rodriguez   MRN: [id number]    Department:  [facility] Emergency Department   Date of Visit:  1/22/2019           After Visit Summary Signature Page    I have received my discharge instructions, and my questions have been answered. I have discussed any challenges I see with this plan with the nurse or doctor.    ..........................................................................................................................................  Patient/Patient Representative Signature      ..........................................................................................................................................  Patient Representative Print Name and Relationship to Patient    ..................................................               ................................................  Date                                   Time    ..........................................................................................................................................  Reviewed by Signature/Title    ...................................................              ..............................................  Date                                               Time          22EPIC Rev 08/18

## 2019-01-22 NOTE — ED PROVIDER NOTES
History     Chief Complaint:  Nausea, Vomiting, & Diarrhea    HPI   Tariq Rodriguez is a 23 year old male who presents with nausea, vomiting, and diarrhea. The patient notes he has had diarrhea, nausea, abdominal pain, and vomiting for the past 2 days. The patient notes he also has hemorrhoids near his rectum. The patient notes he is nauseated right now. The patient denies a history of hemorrhoids. The patient denies fever, blood in his stool, or dysuria.     Allergies:  Penicillins   Robitussin   Tylenol     Medications:    Albuterol    Past Medical History:    ADHD (attention deficit hyperactivity disorder)   Asthma   Bilateral presbyopia   Bipolar 1 disorder    Goiter   Hypertension   Pseudofolliculitis barbae  Drug seeking behavior  Drug idiosyncratic effect  Bilateral presbyopia Goiter  Pseudofolliculitis barbae    Past Surgical History:    History reviewed. No pertinent surgical history.    Family History:    Mother: hypertension  Maternal grandmother: breast cancer    Social History:  The patient was accompanied to the ED by police.  Smoking Status: Current Every Day Smoker   Smokeless Tobacco: Never Used  Alcohol Use: no  Marital Status:  Single     Review of Systems   Constitutional: Negative for fever.   Gastrointestinal: Positive for abdominal pain, diarrhea, nausea and vomiting. Negative for blood in stool.   Genitourinary: Negative for dysuria.   Skin: Positive for wound.   All other systems reviewed and are negative.      Physical Exam     Patient Vitals for the past 24 hrs:   BP Temp Temp src Pulse Heart Rate Resp SpO2 Weight   01/22/19 0404 -- 98.4  F (36.9  C) Oral -- -- -- -- --   01/22/19 0358 107/80 -- -- 91 91 18 99 % 70.3 kg (155 lb)     Physical Exam    HEENT:  mmm  Neck: supple  CV: ppi, regular   Resp: speaking in full sentences with any resp distress  Abd: soft, nt, nd  : Done with a chaperone in the room.  At the 9 o'clock position there is a moderately sized thrombosed external  hemorrhoid.  Ext: peripheral edema present:  No  Skin: warm dry well perfused  Neuro: Alert, no gross motor or sensory deficits,  gait stable        Emergency Department Course   Laboratory:  Laboratory findings were communicated with the patient who voiced understanding of the findings.    CBC: WBC 6.2, HGB 15.0,   BMP: glucose 112(H) o/w WNL (Creatinine 0.90)    Procedures:  Procedure: I & D  Affected area was cleaned with betadine and then wiped with alcohol in sterile fashion.  1% lidocaine with epinephrine was used to infiltrate the area and good anesthesia was achieved.  A number 11 scalpel used to make a stab incision.  Purulent drainage was removed and a culture was sent to lab, results are pending at this time.  The area was then irrigated, and bacitracin ointment was applied.  The wound was then dressed in a sterile dressing.   The patient tolerated this procedure well and there were no complications.     Interventions:  0424 Toradol 15 mg IV  0424 Zofran 4 mg IV  0427 NS 1000 mL IV    Emergency Department Course:    0355 Nursing notes and vitals reviewed.    0400 I performed an exam of the patient as documented above.     0428 IV was inserted and blood was drawn for laboratory testing, results above.     0447 Incision and drainage was performed as noted above.     0455 I personally reviewed the laboratory results with the patient and answered all related questions prior to discharge.    Impression & Plan      Medical Decision Making:  Tariq Rodriguez is a 23 year old male who presents to the emergency department today for evaluation of diarrhea and perianal pain.  Likely has a viral abdominal process and complicating thrombosed hemorrhoid.  I have no significant suspicion for surgical infectious or vascular catastrophe in the abdomen he does not need advanced imaging.  Thrombosed hemorrhoid was excised without complication.  He had outstanding warrants please officers are here and he will be going to  [facility].    Diagnosis:    ICD-10-CM    1. Nausea R11.0    2. Thrombosed external hemorrhoids K64.5    3. Diarrhea, unspecified type R19.7         Disposition:   The patient is discharged to home.    Discharge Medications:     START taking      Dose / Directions   hydrocortisone 2.5 % cream  Commonly known as:  ANUSOL-HC      Place rectally 2 times daily as needed for hemorrhoids  Quantity:  28.35 g  Refills:  0     ondansetron 4 MG ODT tab  Commonly known as:  ZOFRAN ODT      Dose:  4 mg  Take 1 tablet (4 mg) by mouth every 8 hours as needed for nausea or vomiting  Quantity:  10 tablet  Refills:  0           Where to get your medicines      Some of these will need a paper prescription and others can be bought over the counter. Ask your nurse if you have questions.    Bring a paper prescription for each of these medications    hydrocortisone 2.5 % cream    ondansetron 4 MG ODT tab         Scribe Disclosure:  Cecily SANDERS, am serving as a scribe at 4:06 AM on 1/22/2019 to document services personally performed by Galdino Vasquez MD based on my observations and the provider's statements to me.  [facility] EMERGENCY DEPARTMENT       Galdino Vasquez MD  01/22/19 0519

## 2019-01-22 NOTE — ED NOTES
Pt refuses to sign discharge paperwork. [place] [employer] sign for pt and given discharge instructions and prescriptions. Pt discharged ambulating well on departure

## 2019-01-22 NOTE — ED TRIAGE NOTES
"Pt reports having abd pain since Sunday with N/V/D and reports concern for \"bump on my butthole\". He is currently in handcuffs and in police custody  "

## 2019-02-17 ENCOUNTER — HOSPITAL ENCOUNTER (EMERGENCY)
Facility: CLINIC | Age: 24
Discharge: HOME | End: 2019-02-17
Attending: EMERGENCY MEDICINE | Admitting: EMERGENCY MEDICINE

## 2019-02-17 VITALS
HEART RATE: 85 BPM | RESPIRATION RATE: 20 BRPM | OXYGEN SATURATION: 99 % | TEMPERATURE: 97.4 F | DIASTOLIC BLOOD PRESSURE: 82 MMHG | SYSTOLIC BLOOD PRESSURE: 166 MMHG | BODY MASS INDEX: 25.82 KG/M2 | WEIGHT: 160 LBS

## 2019-02-17 DIAGNOSIS — K08.89 PAIN, DENTAL: ICD-10-CM

## 2019-02-17 PROCEDURE — 25000128 H RX IP 250 OP 636: Performed by: EMERGENCY MEDICINE

## 2019-02-17 PROCEDURE — 99284 EMERGENCY DEPT VISIT MOD MDM: CPT

## 2019-02-17 PROCEDURE — 96372 THER/PROPH/DIAG INJ SC/IM: CPT

## 2019-02-17 RX ORDER — LIDOCAINE HYDROCHLORIDE AND EPINEPHRINE 10; 10 MG/ML; UG/ML
INJECTION, SOLUTION INFILTRATION; PERINEURAL
Status: DISCONTINUED
Start: 2019-02-17 | End: 2019-02-17 | Stop reason: HOSPADM

## 2019-02-17 RX ORDER — CLINDAMYCIN HCL 300 MG
300 CAPSULE ORAL 4 TIMES DAILY
Qty: 28 CAPSULE | Refills: 0 | Status: SHIPPED | OUTPATIENT
Start: 2019-02-17 | End: 2019-02-24

## 2019-02-17 RX ORDER — KETOROLAC TROMETHAMINE 30 MG/ML
30 INJECTION, SOLUTION INTRAMUSCULAR; INTRAVENOUS ONCE
Status: COMPLETED | OUTPATIENT
Start: 2019-02-17 | End: 2019-02-17

## 2019-02-17 RX ORDER — BUPIVACAINE HYDROCHLORIDE 5 MG/ML
INJECTION, SOLUTION PERINEURAL
Status: DISCONTINUED
Start: 2019-02-17 | End: 2019-02-17 | Stop reason: HOSPADM

## 2019-02-17 RX ORDER — IBUPROFEN 600 MG/1
600 TABLET, FILM COATED ORAL EVERY 6 HOURS PRN
Qty: 30 TABLET | Refills: 0 | Status: SHIPPED | OUTPATIENT
Start: 2019-02-17 | End: 2019-02-26

## 2019-02-17 RX ADMIN — KETOROLAC TROMETHAMINE 30 MG: 30 INJECTION, SOLUTION INTRAMUSCULAR at 12:57

## 2019-02-17 ASSESSMENT — ENCOUNTER SYMPTOMS
NAUSEA: 0
VOMITING: 0
ABDOMINAL PAIN: 0
SHORTNESS OF BREATH: 0
VOICE CHANGE: 0
TROUBLE SWALLOWING: 0
SORE THROAT: 0
FEVER: 1

## 2019-02-17 NOTE — ED PROVIDER NOTES
History     Chief Complaint:  Dental Pain    HPI   Tariq Rodriguez is a 23 year old male who presents with dental pain. The patient notes that he has been experiencing dental pain for the past couple of months. He has been evaluated by a dentist within the past few months and has been advised to get his tooth pulled. However the patient has not gotten this done due to being wary of the pain that getting his tooth pulled will inflict. He also reports of a subjective fever. No difficulty swallowing, breathing or facial swelling.  Additionally he notes gum sore x 3 days.     Allergies:  Penicillins  Robitussin Pediatric  Tylenol    Medications:    Zyprexa  Albuterol Inhaler  Phenergan with Codeine    Past Medical History:    ADHD    Asthma   Bilateral presbyopia   Bipolar 1 disorder   Goiter   Hypertension  Pseudofolliculitis barbae     Past Surgical History:    History reviewed. No pertinent surgical history.    Family History:    Mother: Hypertension    Social History:  Smoking Status: Current Everyday Smoker  Alcohol Use: No  Patient presents alone  Marital Status:  Single     Review of Systems   Constitutional: Positive for fever (subjective).   HENT: Positive for dental problem and mouth sores. Negative for sore throat, trouble swallowing and voice change.    Respiratory: Negative for shortness of breath.    Gastrointestinal: Negative for abdominal pain, nausea and vomiting.       Physical Exam   First Vitals:  BP: 166/82  Pulse: 85  Temp: 97.4  F (36.3  C)  Resp: 20  Weight: 72.6 kg (160 lb)  SpO2: 99 %      Physical Exam  General: Resting comfortably   Head:  The scalp, face, and head appear normal  Eyes:  The pupils are normal    Conjunctivae and sclera appear normal  ENT:  Moist mucus membranes.  Tooth # 34 cracked and decay noted with mild periodental inflammation.  Tender to palpation.  No drainage.  No abscess along gumline.  Uvula midline.  No cheek or submandibular edema.  No trismus.  Normal voice.    Neck:  Normal range of motion. No swelling/erythema.   MSK:  Moves all extremities equally  Skin:  No rash or lesions noted.  Neuro: Speech is normal and fluent  Psych:  Awake. Alert.  Normal affect.          Emergency Department Course     Emergency Department Course:  Past medical records, nursing notes, and vitals reviewed.  1230: I performed an exam of the patient as documented above. GCS . Clinical findings and plan explained to the Patient. Patient discharged home with instructions regarding supportive care, medications, and reasons to return as well as the importance of close follow-up were reviewed.     1230: The patient declined dental block procedure.  He was agreeable to toradol.          Impression & Plan      Medical Decision Making:  Tariq Rodriguez is a 23 year old male who presents for a toothache. Evaluation today showed tooth 34 acutely tender to percussion. There is no evidence of surrounding abscess. There is no evidence of deep space infection of the neck or any sepsis-like syndrome. Given the concern for early dental infection, the patient was given a prescription for Clindamycin. Pt was given a prescription for Ibuprofen for pain control. The patient was strongly advised to seek dental care as soon as possible; he was provided a list of dental resources. I discussed with the patient that these medications do not represent definitive care for the tooth infection and definitive care by a dentist is needed. The patient will return to the emergency department for fever, uncontrolled pain, inability to tolerate PO, or onset of facial swelling. The patient expressed understanding.     Diagnosis:    ICD-10-CM    1. Pain, dental K08.89        Disposition:  discharged to home    Discharge Medications:     Medication List      Started    * clindamycin 300 MG capsule  Commonly known as:  CLEOCIN  300 mg, Oral, 4 TIMES DAILY     ibuprofen 600 MG tablet  Commonly known as:  ADVIL/MOTRIN  600 mg, Oral, EVERY  6 HOURS PRN            Kati Hawkins  2/17/2019   [facility] EMERGENCY DEPARTMENT  I, Kati Hawkins, am serving as a scribe at 12:30 PM on 2/17/2019 to document services personally performed by Margoth Santos DO based on my observations and the provider's statements to me.          Margoth Santos DO  02/17/19 3033

## 2019-02-17 NOTE — ED AVS SNAPSHOT
[facility] Emergency Department  [address]  [facility] [address]  Phone:  [phone]  Fax:  [phone]                                    Tariq Rodriguez   MRN: [id number]    Department:  [facility] Emergency Department   Date of Visit:  2/17/2019           After Visit Summary Signature Page    I have received my discharge instructions, and my questions have been answered. I have discussed any challenges I see with this plan with the nurse or doctor.    ..........................................................................................................................................  Patient/Patient Representative Signature      ..........................................................................................................................................  Patient Representative Print Name and Relationship to Patient    ..................................................               ................................................  Date                                   Time    ..........................................................................................................................................  Reviewed by Signature/Title    ...................................................              ..............................................  Date                                               Time          22EPIC Rev 08/18

## 2019-02-17 NOTE — DISCHARGE INSTRUCTIONS
Discharge Instructions  Dental Pain    You have been seen today for a toothache. Your pain may be caused by an exposed nerve, an infection (pulpitis), a root abscess (pocket of pus), or other problems. You will need to see a dentist for a solution to your tooth problem. Emergency Department care is only to help control your problem until you can see a dentist; we cannot provide complete dental care.  Today, we did not find any sign that your toothache was caused by any dangerous or life-threatening condition, but sometimes symptoms develop over time and cannot be found during an emergency visit, so it is very important that you follow up with your dentist.      Generally, every Emergency Department visit should have a follow-up clinic visit with either a primary or a specialty clinic/provider. Please follow-up as instructed by your emergency provider today.    Return to the Emergency Department if:  You develop a new fever over 100.4 F.  You cannot open your mouth normally, cannot move your tongue well, or cannot swallow.  You have new or increased swelling of your face or neck.  You develop drainage of pus or foul smelling material from around your tooth.  What can I do to help myself?  Take any antibiotic the provider may have prescribed for you today.  Avoid very hot or very cold foods as both can cause pain.  Make an appointment to see a dentist as soon as possible. Dentists are generally not ?on-staff? at hospitals so we cannot ?refer? to you to dentist but we may be able to provide a list of dental clinics to help you.  If you were given a prescription for medicine here today, be sure to read all of the information (including the package insert) that comes with your prescription.  This will include important information about the medicine, its side effects, and any warnings that you need to know about.  The pharmacist who fills the prescription can provide more information and answer questions you may have  about the medicine.  If you have questions or concerns that the pharmacist cannot address, please call or return to the Emergency Department.   Remember that you can always come back to the Emergency Department if you are not able to see your regular provider in the amount of time listed above, if you get any new symptoms, or if there is anything that worries you.

## 2019-02-17 NOTE — ED TRIAGE NOTES
Right sided dental pain x 2 months. Patient states there is a sore on the bottom right gum x 3 days. Patient states he is having difficulty eating because of the pain. ABC intact alert and no distress.

## 2019-02-18 ENCOUNTER — DOCUMENTATION ONLY (OUTPATIENT)
Dept: OTHER | Facility: CLINIC | Age: 24
End: 2019-02-18

## 2019-02-26 ENCOUNTER — HOSPITAL ENCOUNTER (EMERGENCY)
Facility: CLINIC | Age: 24
Discharge: HOME | End: 2019-02-26
Attending: EMERGENCY MEDICINE | Admitting: EMERGENCY MEDICINE

## 2019-02-26 VITALS
WEIGHT: 155 LBS | RESPIRATION RATE: 16 BRPM | OXYGEN SATURATION: 98 % | SYSTOLIC BLOOD PRESSURE: 131 MMHG | HEART RATE: 96 BPM | DIASTOLIC BLOOD PRESSURE: 58 MMHG | BODY MASS INDEX: 25.02 KG/M2 | TEMPERATURE: 99 F

## 2019-02-26 DIAGNOSIS — J11.1 INFLUENZA-LIKE ILLNESS: ICD-10-CM

## 2019-02-26 PROCEDURE — 25000128 H RX IP 250 OP 636: Performed by: EMERGENCY MEDICINE

## 2019-02-26 PROCEDURE — 99284 EMERGENCY DEPT VISIT MOD MDM: CPT

## 2019-02-26 PROCEDURE — 96372 THER/PROPH/DIAG INJ SC/IM: CPT

## 2019-02-26 RX ORDER — IBUPROFEN 600 MG/1
600 TABLET, FILM COATED ORAL ONCE
Status: DISCONTINUED | OUTPATIENT
Start: 2019-02-26 | End: 2019-02-26

## 2019-02-26 RX ORDER — ONDANSETRON 4 MG/1
4 TABLET, ORALLY DISINTEGRATING ORAL EVERY 8 HOURS PRN
Qty: 10 TABLET | Refills: 0 | Status: SHIPPED | OUTPATIENT
Start: 2019-02-26 | End: 2019-03-01

## 2019-02-26 RX ORDER — IBUPROFEN 600 MG/1
600 TABLET, FILM COATED ORAL EVERY 6 HOURS PRN
Qty: 30 TABLET | Refills: 0 | Status: SHIPPED | OUTPATIENT
Start: 2019-02-26

## 2019-02-26 RX ORDER — KETOROLAC TROMETHAMINE 30 MG/ML
30 INJECTION, SOLUTION INTRAMUSCULAR; INTRAVENOUS ONCE
Status: COMPLETED | OUTPATIENT
Start: 2019-02-26 | End: 2019-02-26

## 2019-02-26 RX ADMIN — KETOROLAC TROMETHAMINE 30 MG: 30 INJECTION, SOLUTION INTRAMUSCULAR at 23:25

## 2019-02-26 ASSESSMENT — ENCOUNTER SYMPTOMS
FEVER: 1
RHINORRHEA: 1
COUGH: 1

## 2019-02-26 NOTE — ED AVS SNAPSHOT
[facility] Emergency Department  [address]  [facility] [address]  Phone:  [phone]  Fax:  [phone]                                    Tariq Rodrgiuez   MRN: [id number]    Department:  [facility] Emergency Department   Date of Visit:  2/26/2019           After Visit Summary Signature Page    I have received my discharge instructions, and my questions have been answered. I have discussed any challenges I see with this plan with the nurse or doctor.    ..........................................................................................................................................  Patient/Patient Representative Signature      ..........................................................................................................................................  Patient Representative Print Name and Relationship to Patient    ..................................................               ................................................  Date                                   Time    ..........................................................................................................................................  Reviewed by Signature/Title    ...................................................              ..............................................  Date                                               Time          22EPIC Rev 08/18

## 2019-02-27 ASSESSMENT — ENCOUNTER SYMPTOMS
NAUSEA: 1
SHORTNESS OF BREATH: 0
APPETITE CHANGE: 1
VOMITING: 0
CONSTIPATION: 0
DIARRHEA: 0
ABDOMINAL PAIN: 0

## 2019-02-27 NOTE — ED PROVIDER NOTES
History     Chief Complaint:  Fever    HPI   Tariq Rodriguez is a 23 year old male who presents to the emergency department today for evaluation of fever. The patient states he took his temperature today and it is 101. He also endorses a dry cough and rhinorrhea. He states he has some diminished appetite due to nausea though denies abdominal pain.  No reported dyspnea, emesis, diarrhea or other symptoms.  Possible sick contacts.       Allergies:  Penicillins  Robitussin  Tylenol      Medications:    Albuterol  Zyprexa    Past Medical History:    ADHD (attention deficit hyperactivity disorder)   Asthma   Bilateral presbyopia   Bipolar 1 disorder    Goiter   Hypertension   Pseudofolliculitis barbae  Anger     Past Surgical History:   History reviewed.  No pertinent past surgical history.     Family History:    Mother: hypertension  Maternal grandmother: breast cancer    Social History:  Smoking Status: Current Every Day Smoker   Types: Cigarettes   Packs/day: 0.25  Smokeless Tobacco: Never Used  Alcohol Use: Negative    Marital Status:  Single      Review of Systems   Constitutional: Positive for appetite change and fever.   HENT: Positive for rhinorrhea.    Respiratory: Positive for cough. Negative for shortness of breath.    Gastrointestinal: Positive for nausea. Negative for abdominal pain, constipation, diarrhea and vomiting.   All other systems reviewed and are negative.      Physical Exam     Patient Vitals for the past 24 hrs:   BP Temp Temp src Pulse Resp SpO2 Weight   02/26/19 2256 131/58 -- -- -- -- -- --   02/26/19 2253 -- 99  F (37.2  C) Oral 102 16 98 % 70.3 kg (155 lb)     Physical Exam  Nursing note and vitals reviewed.  Constitutional: Well nourished. Resting comfortably.   Eyes: Conjunctiva normal.  Pupils are equal, round, and reactive to light.   ENT: Nose normal. Mucous membranes pink and moist.  TM normal. No posterior oropharyngeal erythema/exudate.   Neck: Normal range of motion.  CVS: Normal  "rate, regular rhythm.    Pulmonary: Lungs clear to auscultation bilaterally.   GI: Abdomen soft. Nontender, nondistended.   MSK: No calf tenderness or swelling.  Neuro: Alert. Follows simple commands.  Skin: Skin is warm and dry. No rash noted.   Psychiatric: Blunt affect.       Emergency Department Course     Interventions:  2325 Toradol 30 mg IM    Emergency Department Course:    ED Course as of Feb 26 2334 Tue Feb 26, 2019 2318 I performed a thorough exam of the patient.     2334 I personally answered all related questions prior to discharge.           Impression & Plan      Medical Decision Making:  Tariq Rodrgiuez is a 23 year old male who presents for evaluation of URI stigmata. This is consistent with an influenza like illness.  The patient is nontoxic appearing, in no respiratory distress and has a nonfocal exam.  I doubt serious bacterial infection at this time.  No indication for formal CXR as patient with clear lungs, not hypoxic, I doubt pneumonia.  Patient requested phenergan with codeine for cough suppressant which I discussed I do not typically write prescriptions for this medication due to abuse potential.  I offered tessalon pearles as patient describes a dry cough though patient became very agitated and stated \"that's bullshit give me a doctor who actually will treat me.\"  Patient is allergic to robitussin he reports.  Patient was amenable to toradol in the ED.  I counseled on importance of PO fluids and recommended tylenol/motrin for analgesia.  I also discussed will given ODT zofran for home in setting of reported nausea. Close followup of primary care physician is indicated and return to the ED for high fevers > 103 for more than 48 hours more, increasing productive cough, shortness of breath, or confusion.      Diagnosis:    ICD-10-CM    1. Influenza-like illness R69      Disposition:   The patient is discharged to home.     Discharge Medications:  START taking      Dose / Directions   * " ondansetron 4 MG ODT tab  Commonly known as:  ZOFRAN ODT      Dose:  4 mg  Take 1 tablet (4 mg) by mouth every 8 hours as needed for nausea  Quantity:  10 tablet  Refills:  0         * This list has 1 medication(s) that are the same as other medications prescribed for you. Read the directions carefully, and ask your doctor or other care provider to review them with you.            CONTINUE these medicines which have NOT CHANGED      Dose / Directions   ibuprofen 600 MG tablet  Commonly known as:  ADVIL/MOTRIN      Dose:  600 mg  Take 1 tablet (600 mg) by mouth every 6 hours as needed for moderate pain  Quantity:  30 tablet  Refills:  0           Where to get your medicines      Some of these will need a paper prescription and others can be bought over the counter. Ask your nurse if you have questions.    Bring a paper prescription for each of these medications    ibuprofen 600 MG tablet    ondansetron 4 MG ODT tab        Scribe Disclosure:  I, Apple Werner, am serving as a scribe at 11:13 PM on 2/26/2019 to document services personally performed by Margoth Santos DO based on my observations and the provider's statements to me.      [facility] EMERGENCY DEPARTMENT       Margoth Santos DO  02/27/19 7272

## (undated) RX ORDER — BUPIVACAINE HYDROCHLORIDE AND EPINEPHRINE 5; 5 MG/ML; UG/ML
INJECTION, SOLUTION PERINEURAL
Status: DISPENSED
Start: 2018-07-21